# Patient Record
Sex: MALE | Race: WHITE | URBAN - NONMETROPOLITAN AREA
[De-identification: names, ages, dates, MRNs, and addresses within clinical notes are randomized per-mention and may not be internally consistent; named-entity substitution may affect disease eponyms.]

---

## 2017-09-25 ENCOUNTER — TELEPHONE (OUTPATIENT)
Dept: FAMILY MEDICINE CLINIC | Facility: CLINIC | Age: 62
End: 2017-09-25

## 2018-02-02 ENCOUNTER — PATIENT OUTREACH (OUTPATIENT)
Dept: FAMILY MEDICINE CLINIC | Facility: CLINIC | Age: 63
End: 2018-02-02

## 2018-08-24 ENCOUNTER — MED REC SCAN ONLY (OUTPATIENT)
Dept: FAMILY MEDICINE CLINIC | Facility: CLINIC | Age: 63
End: 2018-08-24

## 2018-09-10 ENCOUNTER — TELEPHONE (OUTPATIENT)
Dept: FAMILY MEDICINE CLINIC | Facility: CLINIC | Age: 63
End: 2018-09-10

## 2018-09-11 ENCOUNTER — OFFICE VISIT (OUTPATIENT)
Dept: FAMILY MEDICINE CLINIC | Facility: CLINIC | Age: 63
End: 2018-09-11
Payer: COMMERCIAL

## 2018-09-11 VITALS
WEIGHT: 238 LBS | OXYGEN SATURATION: 98 % | SYSTOLIC BLOOD PRESSURE: 160 MMHG | TEMPERATURE: 98 F | DIASTOLIC BLOOD PRESSURE: 100 MMHG | BODY MASS INDEX: 36 KG/M2 | HEART RATE: 74 BPM

## 2018-09-11 DIAGNOSIS — R03.0 ELEVATED BLOOD PRESSURE READING: ICD-10-CM

## 2018-09-11 DIAGNOSIS — F43.20 ADULT SITUATIONAL STRESS DISORDER: ICD-10-CM

## 2018-09-11 DIAGNOSIS — G50.0 TRIGEMINAL NEURALGIA: Primary | ICD-10-CM

## 2018-09-11 PROBLEM — N13.8 ENLARGED PROSTATE WITH URINARY OBSTRUCTION: Status: ACTIVE | Noted: 2017-06-29

## 2018-09-11 PROBLEM — N40.1 ENLARGED PROSTATE WITH URINARY OBSTRUCTION: Status: ACTIVE | Noted: 2017-06-29

## 2018-09-11 PROCEDURE — 99214 OFFICE O/P EST MOD 30 MIN: CPT | Performed by: FAMILY MEDICINE

## 2018-09-11 RX ORDER — CARBAMAZEPINE 200 MG/1
200 TABLET ORAL 3 TIMES DAILY
Qty: 30 TABLET | Refills: 1 | Status: SHIPPED | OUTPATIENT
Start: 2018-09-11 | End: 2018-09-26

## 2018-09-11 NOTE — PATIENT INSTRUCTIONS
I reviewed the signs and symptoms of trigeminal neuralgia  Would recommend carbamazepine 200 mg 3 times daily until pain completely resolved and gradually wean over the next 2-3 weeks  I reviewed goals for blood pressure as well as conservative management

## 2018-09-11 NOTE — PROGRESS NOTES
HPI:    Patient ID: Karla Munoz is a 63year old male. Patient presents with:  Pain    HPI patient has not been seen in over 2-1/2 years. He reports a prior history of trigeminal neuralgia diagnosed by Dr Darren Clay 2010.   C/o very sensitive right side of Neurological: He is alert and oriented to person, place, and time. Skin: Skin is warm and dry. No rash noted. Psychiatric: His speech is normal. His affect is blunt. He is slowed.     Blood pressure (!) 160/100, pulse 74, temperature 97.9 °F (36.6 °C),

## 2018-09-26 ENCOUNTER — TELEPHONE (OUTPATIENT)
Dept: FAMILY MEDICINE CLINIC | Facility: CLINIC | Age: 63
End: 2018-09-26

## 2018-09-26 RX ORDER — CARBAMAZEPINE 200 MG/1
400 TABLET ORAL 3 TIMES DAILY
Qty: 180 TABLET | Refills: 2 | Status: SHIPPED | OUTPATIENT
Start: 2018-09-26 | End: 2018-11-16

## 2018-09-26 NOTE — TELEPHONE ENCOUNTER
L/m to c/b to get more info-----BUT SEE BELOW.     He got carbamazepine 200mg 1 po TID #30 with 1 refill on 9/11

## 2018-09-26 NOTE — TELEPHONE ENCOUNTER
REFILL carBAMazepine TO DEJAH PATTEN, PT ONLY HAS 4 LEFT, HE WAS TAKING 2 AT A TIME & IT WAS BARELY HELPING, NOT SURE IF HE CAN TAKE MORE THAN THAT AT A TIME, CALL PT BETWEEN 12-12:30 OR AFTER 3:30 OR LEAVE DETAILED MESSAGE ON VOICEMAIL, PT SAID BEFORE H

## 2018-10-26 RX ORDER — CARBAMAZEPINE 200 MG/1
TABLET ORAL
Qty: 540 TABLET | Refills: 2 | OUTPATIENT
Start: 2018-10-26

## 2018-11-16 RX ORDER — CARBAMAZEPINE 200 MG/1
400 TABLET ORAL 3 TIMES DAILY
Qty: 540 TABLET | Refills: 0 | Status: SHIPPED | OUTPATIENT
Start: 2018-11-16 | End: 2019-01-09

## 2018-11-16 NOTE — TELEPHONE ENCOUNTER
Patient needs the CarBAMazepine 200 MG  Called to the mail order pharmacy-  [phone]   Fax [phone]    Use order # [id number]    Marcus Gomez stated that the mail order pharmacy contacted us about his refill and they haven't gotten a response.

## 2019-01-09 RX ORDER — CARBAMAZEPINE 200 MG/1
TABLET ORAL
Qty: 540 TABLET | Refills: 0 | Status: SHIPPED | OUTPATIENT
Start: 2019-01-09 | End: 2019-10-07

## 2019-03-15 ENCOUNTER — PATIENT OUTREACH (OUTPATIENT)
Dept: FAMILY MEDICINE CLINIC | Facility: CLINIC | Age: 64
End: 2019-03-15

## 2019-08-01 ENCOUNTER — PATIENT OUTREACH (OUTPATIENT)
Dept: FAMILY MEDICINE CLINIC | Facility: CLINIC | Age: 64
End: 2019-08-01

## 2019-08-01 NOTE — PROGRESS NOTES
Please call patient to schedule physical/Medicare AWV.  Overdue for colon cancer screening (and, likely, other services as well). Left message for patient to call back. Letter sent.

## 2019-10-07 ENCOUNTER — PATIENT OUTREACH (OUTPATIENT)
Dept: FAMILY MEDICINE CLINIC | Facility: CLINIC | Age: 64
End: 2019-10-07

## 2019-10-07 RX ORDER — CARBAMAZEPINE 200 MG/1
TABLET ORAL
Qty: 540 TABLET | Refills: 0 | Status: SHIPPED | OUTPATIENT
Start: 2019-10-07 | End: 2020-01-17

## 2019-12-30 ENCOUNTER — TELEPHONE (OUTPATIENT)
Dept: FAMILY MEDICINE CLINIC | Facility: CLINIC | Age: 64
End: 2019-12-30

## 2019-12-30 NOTE — TELEPHONE ENCOUNTER
Patient has pain in his thumb and fingers and thinks that it might be Carpal Tunnel. Who would Dr Khari Whitney recommend for this? Can leave a message if he does not answer.

## 2019-12-30 NOTE — TELEPHONE ENCOUNTER
I would recommend patient be seen first to determine what the problem actually is.   If he is convinced that this is carpal tunnel syndrome and simply wants to see a specialist, then I would recommend Dr. Kylah Graff at [facility]

## 2020-01-03 ENCOUNTER — OFFICE VISIT (OUTPATIENT)
Dept: FAMILY MEDICINE CLINIC | Facility: CLINIC | Age: 65
End: 2020-01-03
Payer: COMMERCIAL

## 2020-01-03 VITALS
SYSTOLIC BLOOD PRESSURE: 130 MMHG | HEIGHT: 67.75 IN | BODY MASS INDEX: 34.96 KG/M2 | DIASTOLIC BLOOD PRESSURE: 86 MMHG | WEIGHT: 228 LBS | TEMPERATURE: 98 F | HEART RATE: 72 BPM | OXYGEN SATURATION: 96 %

## 2020-01-03 DIAGNOSIS — K76.0 HEPATIC STEATOSIS: ICD-10-CM

## 2020-01-03 DIAGNOSIS — G50.0 TRIGEMINAL NEURALGIA: ICD-10-CM

## 2020-01-03 DIAGNOSIS — Z12.11 SCREEN FOR COLON CANCER: ICD-10-CM

## 2020-01-03 DIAGNOSIS — Z00.00 PREVENTATIVE HEALTH CARE: Primary | ICD-10-CM

## 2020-01-03 DIAGNOSIS — K59.01 SLOW TRANSIT CONSTIPATION: ICD-10-CM

## 2020-01-03 DIAGNOSIS — Z23 NEED FOR VACCINATION: ICD-10-CM

## 2020-01-03 DIAGNOSIS — M77.8 THUMB TENDONITIS: ICD-10-CM

## 2020-01-03 DIAGNOSIS — Z13.220 SCREENING, LIPID: ICD-10-CM

## 2020-01-03 DIAGNOSIS — E66.9 OBESITY (BMI 30.0-34.9): ICD-10-CM

## 2020-01-03 DIAGNOSIS — N13.8 ENLARGED PROSTATE WITH URINARY OBSTRUCTION: ICD-10-CM

## 2020-01-03 DIAGNOSIS — D17.0 LIPOMA OF NECK: ICD-10-CM

## 2020-01-03 DIAGNOSIS — Z00.00 LABORATORY EXAM ORDERED AS PART OF ROUTINE GENERAL MEDICAL EXAMINATION: ICD-10-CM

## 2020-01-03 DIAGNOSIS — Z12.5 SCREENING FOR PROSTATE CANCER: ICD-10-CM

## 2020-01-03 DIAGNOSIS — N40.1 ENLARGED PROSTATE WITH URINARY OBSTRUCTION: ICD-10-CM

## 2020-01-03 DIAGNOSIS — R14.0 ABDOMINAL BLOATING: ICD-10-CM

## 2020-01-03 DIAGNOSIS — C67.2 MALIGNANT NEOPLASM OF LATERAL WALL OF URINARY BLADDER (HCC): ICD-10-CM

## 2020-01-03 PROCEDURE — 99396 PREV VISIT EST AGE 40-64: CPT | Performed by: FAMILY MEDICINE

## 2020-01-03 PROCEDURE — 90471 IMMUNIZATION ADMIN: CPT | Performed by: FAMILY MEDICINE

## 2020-01-03 PROCEDURE — 90715 TDAP VACCINE 7 YRS/> IM: CPT | Performed by: FAMILY MEDICINE

## 2020-01-03 NOTE — H&P
Hayley Hartley is a 60year old male who presents for a complete physical exam.   HPI:   Pt voices concerns of bilateral hand pains- thru thumbs.  Hands occ fall asleep  Lump on left side back of neck- wants it removed    Wt Readings from Last 6 Encounters: +intermitent right sided facial pain, lips twinge, off meds now  LUNGS: denies shortness of breath with exertion, no coughing or wheezing  CARDIOVASCULAR: denies chest pain on exertion, palpations, anginal equivalent symptoms, no claudication , no peripher 3,cranial nerves are intact,motor and sensory are grossly intact, DTRs +2/4 UE/LE bilaterally, negative Tinel sign at wrist and elbows, negative Phalen sign bilaterally, negative Finkelstein test  PSYCH: affect: bright, speech: clear and coherent, Insight: of treatment. Will defer to urology    5. Screening for prostate cancer  Check PSA per urology    6. Screen for colon cancer  Discussed colon cancer screening options. Cologuard ordered    7.  Screening, lipid  I reviewed goals for lipids  - LIPID PANEL;

## 2020-01-03 NOTE — PATIENT INSTRUCTIONS
1. Preventative health care  I reviewed age-appropriate preventive health screening exams    2. Mal[facility]-  8/24/2018. Follow-up with Dr. Giancarlo Calixto as scheduled    3.  Trigeminal neuralgia-currently a forearm stretches. Changing his positions frequently while at work  Patient given prescription trial of diclofenac 50 mg twice daily, taken with food    King Nereyda.  [place] Westo, FAAFP

## 2020-01-06 ENCOUNTER — TELEPHONE (OUTPATIENT)
Dept: FAMILY MEDICINE CLINIC | Facility: CLINIC | Age: 65
End: 2020-01-06

## 2020-01-06 NOTE — TELEPHONE ENCOUNTER
Gigi Patton is calling because he saw Dr Luis Manrique on Friday an he said that Gigi Patton needed to have a Colagaurd done.   Gigi Patton was on the phone with his ins company and mentioned that he needed to have this test done and the ins company said that we needed to contact th

## 2020-01-07 ENCOUNTER — TELEPHONE (OUTPATIENT)
Dept: FAMILY MEDICINE CLINIC | Facility: CLINIC | Age: 65
End: 2020-01-07

## 2020-01-07 DIAGNOSIS — E78.00 ELEVATED CHOLESTEROL: Primary | ICD-10-CM

## 2020-01-07 LAB
CHOLESTEROL, TOTAL: 288
HDL CHOL: 44
LDL CHOLESTEROL: 208 MG/DL (ref ?–130)
TRIGLYCERIDES: 198

## 2020-01-07 NOTE — TELEPHONE ENCOUNTER
Please advise patient I reviewed his labs. His cholesterol is extremely high. His good cholesterol is relatively low and his bad cholesterol is shockingly high.   I would strongly recommend to start lipid-lowering therapy such as rosuvastatin 20 mg nightl

## 2020-01-09 RX ORDER — ROSUVASTATIN CALCIUM 20 MG/1
20 TABLET, COATED ORAL NIGHTLY
Qty: 90 TABLET | Refills: 0 | Status: SHIPPED | OUTPATIENT
Start: 2020-01-09 | End: 2020-04-22

## 2020-01-09 NOTE — TELEPHONE ENCOUNTER
Pt advised of below. States his diet is high is venison-----asks if that is the reason his chol is elevated? Advised that it may contribute to it, but his readings are very high, so even if he cut back, it would not likely bring it down to a normal level.

## 2020-01-17 RX ORDER — CARBAMAZEPINE 200 MG/1
TABLET ORAL
Qty: 540 TABLET | Refills: 0 | Status: SHIPPED | OUTPATIENT
Start: 2020-01-17 | End: 2020-06-29

## 2020-01-17 NOTE — TELEPHONE ENCOUNTER
Last refill #540 on 10/47/19  Last office visit on cpx on 1/32020  Future Appointments   Date Time Provider Roselyn Vasques   1/21/2020 11:15 AM DO SEB Corona [facility] Kady Solis

## 2020-01-21 ENCOUNTER — OFFICE VISIT (OUTPATIENT)
Dept: SURGERY | Facility: CLINIC | Age: 65
End: 2020-01-21
Payer: COMMERCIAL

## 2020-01-21 VITALS — WEIGHT: 228 LBS | HEIGHT: 68 IN | HEART RATE: 65 BPM | BODY MASS INDEX: 34.56 KG/M2

## 2020-01-21 DIAGNOSIS — R22.1 MASS OF NECK: Primary | ICD-10-CM

## 2020-01-21 PROCEDURE — 99243 OFF/OP CNSLTJ NEW/EST LOW 30: CPT | Performed by: SURGERY

## 2020-01-21 NOTE — H&P
Dennie Griffon is a 60year old male  Patient presents with:  Lump: New patient referred byDr. Marine Montana for fatty tumor on neck. c/o lump on neck for 20 years. Denies any pain.        REFERRED BY    Patient presents with  Lump at base of neck on Left has been diplopia or vision changes  RESPIRATORY: denies shortness of breath, wheezing or cough   CARDIOVASCULAR: denies chest pain or DELAROSA; no palpitations   GI: denies nausea, vomiting, constipation, diarrhea; no rectal bleeding; no heartburn  GENITAL/: no dysur

## 2020-01-26 ENCOUNTER — TELEPHONE (OUTPATIENT)
Dept: FAMILY MEDICINE CLINIC | Facility: CLINIC | Age: 65
End: 2020-01-26

## 2020-02-05 PROCEDURE — 88305 TISSUE EXAM BY PATHOLOGIST: CPT | Performed by: SURGERY

## 2020-02-05 PROCEDURE — 88304 TISSUE EXAM BY PATHOLOGIST: CPT | Performed by: SURGERY

## 2020-02-24 ENCOUNTER — MED REC SCAN ONLY (OUTPATIENT)
Dept: SURGERY | Facility: CLINIC | Age: 65
End: 2020-02-24

## 2020-04-22 ENCOUNTER — TELEPHONE (OUTPATIENT)
Dept: FAMILY MEDICINE CLINIC | Facility: CLINIC | Age: 65
End: 2020-04-22

## 2020-04-22 RX ORDER — ROSUVASTATIN CALCIUM 20 MG/1
20 TABLET, COATED ORAL NIGHTLY
Qty: 90 TABLET | Refills: 0 | Status: SHIPPED | OUTPATIENT
Start: 2020-04-22 | End: 2020-06-29

## 2020-06-29 RX ORDER — ROSUVASTATIN CALCIUM 20 MG/1
TABLET, COATED ORAL
Qty: 90 TABLET | Refills: 0 | Status: SHIPPED | OUTPATIENT
Start: 2020-06-29 | End: 2020-10-13

## 2020-06-29 RX ORDER — CARBAMAZEPINE 200 MG/1
TABLET ORAL
Qty: 540 TABLET | Refills: 0 | Status: SHIPPED | OUTPATIENT
Start: 2020-06-29 | End: 2021-08-31 | Stop reason: ALTCHOICE

## 2020-06-29 NOTE — TELEPHONE ENCOUNTER
Please determine if patient taking the medication. Is is a pharmacy triggered refill request is the patient requesting it.   Last contact, patient was no longer taking medication

## 2020-09-04 ENCOUNTER — TELEPHONE (OUTPATIENT)
Dept: FAMILY MEDICINE CLINIC | Facility: CLINIC | Age: 65
End: 2020-09-04

## 2020-09-04 ENCOUNTER — VIRTUAL PHONE E/M (OUTPATIENT)
Dept: FAMILY MEDICINE CLINIC | Facility: CLINIC | Age: 65
End: 2020-09-04
Payer: COMMERCIAL

## 2020-09-04 DIAGNOSIS — J01.40 ACUTE NON-RECURRENT PANSINUSITIS: Primary | ICD-10-CM

## 2020-09-04 PROCEDURE — 99213 OFFICE O/P EST LOW 20 MIN: CPT | Performed by: FAMILY MEDICINE

## 2020-09-04 RX ORDER — AMOXICILLIN AND CLAVULANATE POTASSIUM 875; 125 MG/1; MG/1
1 TABLET, FILM COATED ORAL 2 TIMES DAILY
Qty: 20 TABLET | Refills: 0 | Status: SHIPPED | OUTPATIENT
Start: 2020-09-04 | End: 2020-09-14

## 2020-09-04 NOTE — PROGRESS NOTES
Virtual Telephone Check-In    Andrea Prajapati verbally consents to a Virtual/Telephone Check-In visit on 09/04/20. Patient has been referred to the [facility] website at [email or web address] to review the yearly Consent to Treat document.     Patient understand but denies any shortness of breath, fever, myalgia, change in sense of taste or smell, diarrhea. He has been able exercise despite the symptoms.   He has been using Sudafed which provides some relief    Diagnoses and all orders for this visit:    Acute non

## 2020-09-04 NOTE — TELEPHONE ENCOUNTER
Virtual/Telephone Check-In    Kwaku Moy verbally consents to a Virtual/Telephone Check-In service on 09/04/2020. Patient has been referred to the [facility] website at [email or web address] to review the yearly Consent to Treat document.   Patient Mariella Mena

## 2020-10-12 NOTE — TELEPHONE ENCOUNTER
Last OV: 9/4/10 (telemed)  Last labs: 1/7/20    No future appointments.     Due for fasting labs----

## 2020-10-13 ENCOUNTER — TELEPHONE (OUTPATIENT)
Dept: FAMILY MEDICINE CLINIC | Facility: CLINIC | Age: 65
End: 2020-10-13

## 2020-10-13 DIAGNOSIS — R97.20 ABNORMAL PSA: ICD-10-CM

## 2020-10-13 DIAGNOSIS — Z79.899 ENCOUNTER FOR LONG-TERM (CURRENT) DRUG USE: ICD-10-CM

## 2020-10-13 DIAGNOSIS — E78.00 ELEVATED CHOLESTEROL: Primary | ICD-10-CM

## 2020-10-13 RX ORDER — ROSUVASTATIN CALCIUM 20 MG/1
TABLET, COATED ORAL
Qty: 90 TABLET | Refills: 0 | Status: SHIPPED | OUTPATIENT
Start: 2020-10-13 | End: 2021-02-16

## 2020-10-13 NOTE — TELEPHONE ENCOUNTER
Advised due for CMP and lipids.     Pt needs PSA for Dr. Philip Butcher also---will call his ofc for orders

## 2020-10-14 NOTE — TELEPHONE ENCOUNTER
Lab appt scheduled 10/21    Fax sent to Dr. Torrey Lyn of[facility] asking them to fax us pt's PSA order for his upcoming lab appt

## 2020-10-21 ENCOUNTER — LAB ENCOUNTER (OUTPATIENT)
Dept: LAB | Age: 65
End: 2020-10-21
Attending: FAMILY MEDICINE
Payer: COMMERCIAL

## 2020-10-21 ENCOUNTER — IMMUNIZATION (OUTPATIENT)
Dept: FAMILY MEDICINE CLINIC | Facility: CLINIC | Age: 65
End: 2020-10-21
Payer: COMMERCIAL

## 2020-10-21 DIAGNOSIS — Z23 NEED FOR VACCINATION: ICD-10-CM

## 2020-10-21 DIAGNOSIS — Z79.899 ENCOUNTER FOR LONG-TERM (CURRENT) DRUG USE: ICD-10-CM

## 2020-10-21 DIAGNOSIS — E78.00 ELEVATED CHOLESTEROL: ICD-10-CM

## 2020-10-21 DIAGNOSIS — R97.20 ABNORMAL PSA: ICD-10-CM

## 2020-10-21 PROCEDURE — 90686 IIV4 VACC NO PRSV 0.5 ML IM: CPT | Performed by: FAMILY MEDICINE

## 2020-10-21 PROCEDURE — 84153 ASSAY OF PSA TOTAL: CPT | Performed by: FAMILY MEDICINE

## 2020-10-21 PROCEDURE — 90471 IMMUNIZATION ADMIN: CPT | Performed by: FAMILY MEDICINE

## 2020-10-22 DIAGNOSIS — R97.20 ELEVATED PSA: ICD-10-CM

## 2020-10-22 DIAGNOSIS — E78.00 ELEVATED CHOLESTEROL: Primary | ICD-10-CM

## 2020-10-22 DIAGNOSIS — Z79.899 ENCOUNTER FOR LONG-TERM (CURRENT) DRUG USE: ICD-10-CM

## 2020-10-29 NOTE — TELEPHONE ENCOUNTER
LAST OV: 3/22/16   LAST REFILL: NO RECORD OF IT BEING PRESCRIBED. No future appointments.
Please advise patient that he needs an office visit. I have never seen him for trigeminal neuralgia nor prescribed the medication.
Pt advised of below. Offered different appt times tomorrow--pt doesn't think he can wait that long.    Recommended UC for eval
REFILL CARBAMAZEPINE TO DEJAH PATTEN, HE IS HAVING A FLARE UP OF Trigeminal neuralgia & IS IN ALOT OF PAIN
no

## 2021-02-16 RX ORDER — ROSUVASTATIN CALCIUM 20 MG/1
TABLET, COATED ORAL
Qty: 90 TABLET | Refills: 0 | Status: SHIPPED | OUTPATIENT
Start: 2021-02-16 | End: 2021-06-28

## 2021-06-28 RX ORDER — ROSUVASTATIN CALCIUM 20 MG/1
20 TABLET, COATED ORAL EVERY EVENING
Qty: 30 TABLET | Refills: 0 | Status: SHIPPED | OUTPATIENT
Start: 2021-06-28 | End: 2021-08-25

## 2021-06-28 NOTE — TELEPHONE ENCOUNTER
Last oV: 1/3/20  Last labs: 10/21/20    No future appointments. Due for OV and fasting labs--- Unable to leave message d/t continuous ring.

## 2021-08-25 RX ORDER — ROSUVASTATIN CALCIUM 20 MG/1
TABLET, COATED ORAL
Qty: 30 TABLET | Refills: 11 | Status: SHIPPED | OUTPATIENT
Start: 2021-08-25

## 2021-08-31 ENCOUNTER — LABORATORY ENCOUNTER (OUTPATIENT)
Dept: LAB | Age: 66
End: 2021-08-31
Attending: FAMILY MEDICINE
Payer: COMMERCIAL

## 2021-08-31 ENCOUNTER — OFFICE VISIT (OUTPATIENT)
Dept: FAMILY MEDICINE CLINIC | Facility: CLINIC | Age: 66
End: 2021-08-31
Payer: COMMERCIAL

## 2021-08-31 VITALS
DIASTOLIC BLOOD PRESSURE: 86 MMHG | HEIGHT: 68 IN | HEART RATE: 57 BPM | SYSTOLIC BLOOD PRESSURE: 130 MMHG | OXYGEN SATURATION: 96 % | BODY MASS INDEX: 34.56 KG/M2 | WEIGHT: 228 LBS | TEMPERATURE: 98 F | RESPIRATION RATE: 18 BRPM

## 2021-08-31 DIAGNOSIS — R31.29 OTHER MICROSCOPIC HEMATURIA: ICD-10-CM

## 2021-08-31 DIAGNOSIS — C67.2 MALIGNANT NEOPLASM OF LATERAL WALL OF URINARY BLADDER (HCC): ICD-10-CM

## 2021-08-31 DIAGNOSIS — R06.83 SNORING: ICD-10-CM

## 2021-08-31 DIAGNOSIS — Z79.899 ENCOUNTER FOR LONG-TERM (CURRENT) DRUG USE: ICD-10-CM

## 2021-08-31 DIAGNOSIS — Z23 NEED FOR VACCINATION: ICD-10-CM

## 2021-08-31 DIAGNOSIS — R97.20 ABNORMAL PSA: ICD-10-CM

## 2021-08-31 DIAGNOSIS — E78.00 ELEVATED CHOLESTEROL: ICD-10-CM

## 2021-08-31 DIAGNOSIS — E78.00 HYPERCHOLESTEREMIA: ICD-10-CM

## 2021-08-31 DIAGNOSIS — R53.83 FATIGUE, UNSPECIFIED TYPE: ICD-10-CM

## 2021-08-31 DIAGNOSIS — M18.0 OSTEOARTHRITIS OF CARPOMETACARPAL (CMC) JOINTS OF BOTH THUMBS, UNSPECIFIED OSTEOARTHRITIS TYPE: ICD-10-CM

## 2021-08-31 DIAGNOSIS — Z00.00 PREVENTATIVE HEALTH CARE: Primary | ICD-10-CM

## 2021-08-31 DIAGNOSIS — G50.0 TRIGEMINAL NEURALGIA: ICD-10-CM

## 2021-08-31 DIAGNOSIS — I70.0 ATHEROSCLEROSIS OF AORTA (HCC): ICD-10-CM

## 2021-08-31 DIAGNOSIS — N40.0 BENIGN PROSTATIC HYPERPLASIA WITHOUT LOWER URINARY TRACT SYMPTOMS: ICD-10-CM

## 2021-08-31 DIAGNOSIS — K76.0 HEPATIC STEATOSIS: ICD-10-CM

## 2021-08-31 DIAGNOSIS — R97.20 ELEVATED PSA: ICD-10-CM

## 2021-08-31 LAB
ALBUMIN SERPL-MCNC: 4.1 G/DL (ref 3.4–5)
ALBUMIN/GLOB SERPL: 1.1 {RATIO} (ref 1–2)
ALP LIVER SERPL-CCNC: 56 U/L
ALT SERPL-CCNC: 50 U/L
ANION GAP SERPL CALC-SCNC: 8 MMOL/L (ref 0–18)
APPEARANCE: CLEAR
AST SERPL-CCNC: 28 U/L (ref 15–37)
BILIRUB SERPL-MCNC: 0.6 MG/DL (ref 0.1–2)
BILIRUBIN: NEGATIVE
BUN BLD-MCNC: 12 MG/DL (ref 7–18)
CALCIUM BLD-MCNC: 9.1 MG/DL (ref 8.5–10.1)
CHLORIDE SERPL-SCNC: 108 MMOL/L (ref 98–112)
CHOLEST SMN-MCNC: 158 MG/DL (ref ?–200)
CO2 SERPL-SCNC: 24 MMOL/L (ref 21–32)
COMPLEXED PSA SERPL-MCNC: 3.75 NG/ML (ref ?–4)
CREAT BLD-MCNC: 0.7 MG/DL
GLOBULIN PLAS-MCNC: 3.6 G/DL (ref 2.8–4.4)
GLUCOSE (URINE DIPSTICK): NEGATIVE MG/DL
GLUCOSE BLD-MCNC: 95 MG/DL (ref 70–99)
HDLC SERPL-MCNC: 43 MG/DL (ref 40–59)
KETONES (URINE DIPSTICK): NEGATIVE MG/DL
LDLC SERPL CALC-MCNC: 89 MG/DL (ref ?–100)
M PROTEIN MFR SERPL ELPH: 7.7 G/DL (ref 6.4–8.2)
MULTISTIX LOT#: 5077 NUMERIC
NITRITE, URINE: POSITIVE
NONHDLC SERPL-MCNC: 115 MG/DL (ref ?–130)
OSMOLALITY SERPL CALC.SUM OF ELEC: 290 MOSM/KG (ref 275–295)
PH, URINE: 6 (ref 4.5–8)
POTASSIUM SERPL-SCNC: 4 MMOL/L (ref 3.5–5.1)
PROTEIN (URINE DIPSTICK): NEGATIVE MG/DL
SODIUM SERPL-SCNC: 140 MMOL/L (ref 136–145)
SPECIFIC GRAVITY: 1.02 (ref 1–1.03)
TRIGL SERPL-MCNC: 146 MG/DL (ref 30–149)
URINE-COLOR: YELLOW
UROBILINOGEN,SEMI-QN: 0.2 MG/DL (ref 0–1.9)
VLDLC SERPL CALC-MCNC: 24 MG/DL (ref 0–30)

## 2021-08-31 PROCEDURE — 87086 URINE CULTURE/COLONY COUNT: CPT | Performed by: FAMILY MEDICINE

## 2021-08-31 PROCEDURE — 87077 CULTURE AEROBIC IDENTIFY: CPT | Performed by: FAMILY MEDICINE

## 2021-08-31 PROCEDURE — 3075F SYST BP GE 130 - 139MM HG: CPT | Performed by: FAMILY MEDICINE

## 2021-08-31 PROCEDURE — 3079F DIAST BP 80-89 MM HG: CPT | Performed by: FAMILY MEDICINE

## 2021-08-31 PROCEDURE — 3008F BODY MASS INDEX DOCD: CPT | Performed by: FAMILY MEDICINE

## 2021-08-31 PROCEDURE — 90471 IMMUNIZATION ADMIN: CPT | Performed by: FAMILY MEDICINE

## 2021-08-31 PROCEDURE — 90750 HZV VACC RECOMBINANT IM: CPT | Performed by: FAMILY MEDICINE

## 2021-08-31 PROCEDURE — 99396 PREV VISIT EST AGE 40-64: CPT | Performed by: FAMILY MEDICINE

## 2021-08-31 PROCEDURE — 81003 URINALYSIS AUTO W/O SCOPE: CPT | Performed by: FAMILY MEDICINE

## 2021-08-31 RX ORDER — TADALAFIL 20 MG/1
TABLET ORAL
COMMUNITY
Start: 2021-07-07 | End: 2021-08-31 | Stop reason: ALTCHOICE

## 2021-08-31 NOTE — PATIENT INSTRUCTIONS
1. Preventative health care  I reviewed age-appropriate preventative health screening exams    2. [facility]  [facility]24[facility]2018.  cystoscopy 11/4/20, clear  Follow-up with Dr. Figueroa Dawson as scheduled for cystos

## 2021-08-31 NOTE — H&P
Andrea Prajapati is a 63year old male who presents for a complete physical exam.   HPI:   Pt voices concerns of bilateral thumb pain.     Wt Readings from Last 6 Encounters:  08/31/21 : 228 lb (103.4 kg)  01/28/20 : 228 lb (103.4 kg)  01/21/20 : 228 lb (103.4 urology   Occ: retired [employer] last yr. , Children: son.  Moved in w/ girlfriend  Exercise: three times per week.elliptical daily,  Diet: watches sugar closely          REVIEW OF SYSTEMS:   GENERAL: generally feels well, + fatigue, denies excessive da without murmur, no S3, S4, strong peripheral pulses, no peripheral edema  GI: +NBS's,no masses, HSM or tenderness  : deferred due to Urology eval  RECTAL: deferred due to Urology eval  BACK:  : FROM, No lateral curves   EXTREMITIES: no cyanosis, clubbing exams    2. Malignant neoplasm of lateral wall of [facility] bladder ([facility]-  8/24/2018. cystoscopy 11/4/20, clear  Follow-up with Dr. Sandi Ireland as scheduled for cystoscopy  - URINALYSIS, AUTO, W/O SCOPE    3.  Benign prostatic hyperplasia without lower u

## 2021-09-07 RX ORDER — CARBAMAZEPINE 200 MG/1
TABLET ORAL
Qty: 540 TABLET | Refills: 3 | OUTPATIENT
Start: 2021-09-07

## 2021-11-03 ENCOUNTER — TELEPHONE (OUTPATIENT)
Dept: FAMILY MEDICINE CLINIC | Facility: CLINIC | Age: 66
End: 2021-11-03

## 2021-11-03 DIAGNOSIS — Z23 NEED FOR SHINGLES VACCINE: Primary | ICD-10-CM

## 2021-11-03 NOTE — TELEPHONE ENCOUNTER
Pt. Is asking if he can come in tomorrow around 1130 for his second shingles vaccine/flu and possible pneumonia. No openings. I did put him in for Monday at 11 but he said he will be in town tomorrow for another appt.

## 2021-11-03 NOTE — TELEPHONE ENCOUNTER
Pt is due for 2nd Shingrix. Order placed--please sign. Due for pneumonia vaccine? ?    Will give flu vaccine also.

## 2021-11-04 ENCOUNTER — NURSE ONLY (OUTPATIENT)
Dept: FAMILY MEDICINE CLINIC | Facility: CLINIC | Age: 66
End: 2021-11-04
Payer: COMMERCIAL

## 2021-11-04 DIAGNOSIS — Z23 NEED FOR VACCINATION: Primary | ICD-10-CM

## 2021-11-04 PROCEDURE — 90471 IMMUNIZATION ADMIN: CPT | Performed by: FAMILY MEDICINE

## 2021-11-04 PROCEDURE — 90750 HZV VACC RECOMBINANT IM: CPT | Performed by: FAMILY MEDICINE

## 2021-11-04 PROCEDURE — 90472 IMMUNIZATION ADMIN EACH ADD: CPT | Performed by: FAMILY MEDICINE

## 2021-11-04 PROCEDURE — 90686 IIV4 VACC NO PRSV 0.5 ML IM: CPT | Performed by: FAMILY MEDICINE

## 2022-03-10 ENCOUNTER — OFFICE VISIT (OUTPATIENT)
Dept: FAMILY MEDICINE CLINIC | Facility: CLINIC | Age: 67
End: 2022-03-10
Payer: COMMERCIAL

## 2022-03-10 ENCOUNTER — LAB ENCOUNTER (OUTPATIENT)
Dept: LAB | Age: 67
End: 2022-03-10
Attending: FAMILY MEDICINE
Payer: COMMERCIAL

## 2022-03-10 VITALS
DIASTOLIC BLOOD PRESSURE: 80 MMHG | WEIGHT: 235 LBS | SYSTOLIC BLOOD PRESSURE: 120 MMHG | OXYGEN SATURATION: 98 % | HEART RATE: 64 BPM | RESPIRATION RATE: 18 BRPM | HEIGHT: 68 IN | TEMPERATURE: 98 F | BODY MASS INDEX: 35.61 KG/M2

## 2022-03-10 DIAGNOSIS — R39.12 BENIGN PROSTATIC HYPERPLASIA WITH WEAK URINARY STREAM: ICD-10-CM

## 2022-03-10 DIAGNOSIS — G50.0 TRIGEMINAL NEURALGIA: ICD-10-CM

## 2022-03-10 DIAGNOSIS — E66.01 SEVERE OBESITY (BMI 35.0-39.9) WITH COMORBIDITY (HCC): ICD-10-CM

## 2022-03-10 DIAGNOSIS — C67.2 MALIGNANT NEOPLASM OF LATERAL WALL OF URINARY BLADDER (HCC): ICD-10-CM

## 2022-03-10 DIAGNOSIS — I70.0 ATHEROSCLEROSIS OF AORTA (HCC): ICD-10-CM

## 2022-03-10 DIAGNOSIS — N40.1 BENIGN PROSTATIC HYPERPLASIA WITH WEAK URINARY STREAM: ICD-10-CM

## 2022-03-10 DIAGNOSIS — Z01.818 PRE-OP EVALUATION: Primary | ICD-10-CM

## 2022-03-10 DIAGNOSIS — E78.00 HYPERCHOLESTEREMIA: ICD-10-CM

## 2022-03-10 DIAGNOSIS — Z01.818 PRE-OP EVALUATION: ICD-10-CM

## 2022-03-10 DIAGNOSIS — M18.0 OSTEOARTHRITIS OF CARPOMETACARPAL (CMC) JOINTS OF BOTH THUMBS, UNSPECIFIED OSTEOARTHRITIS TYPE: ICD-10-CM

## 2022-03-10 DIAGNOSIS — N52.01 ERECTILE DYSFUNCTION DUE TO ARTERIAL INSUFFICIENCY: ICD-10-CM

## 2022-03-10 LAB
ANION GAP SERPL CALC-SCNC: 5 MMOL/L (ref 0–18)
BASOPHILS # BLD AUTO: 0.05 X10(3) UL (ref 0–0.2)
BASOPHILS NFR BLD AUTO: 0.9 %
BUN BLD-MCNC: 9 MG/DL (ref 7–18)
CALCIUM BLD-MCNC: 9.1 MG/DL (ref 8.5–10.1)
CHLORIDE SERPL-SCNC: 107 MMOL/L (ref 98–112)
CHOLEST SERPL-MCNC: 171 MG/DL (ref ?–200)
CO2 SERPL-SCNC: 26 MMOL/L (ref 21–32)
CREAT BLD-MCNC: 0.74 MG/DL
EOSINOPHIL # BLD AUTO: 0.13 X10(3) UL (ref 0–0.7)
EOSINOPHIL NFR BLD AUTO: 2.2 %
ERYTHROCYTE [DISTWIDTH] IN BLOOD BY AUTOMATED COUNT: 11.9 %
GLUCOSE BLD-MCNC: 86 MG/DL (ref 70–99)
HCT VFR BLD AUTO: 48.1 %
HDLC SERPL-MCNC: 45 MG/DL (ref 40–59)
HGB BLD-MCNC: 16 G/DL
IMM GRANULOCYTES # BLD AUTO: 0.01 X10(3) UL (ref 0–1)
IMM GRANULOCYTES NFR BLD: 0.2 %
LDLC SERPL CALC-MCNC: 85 MG/DL (ref ?–100)
LYMPHOCYTES # BLD AUTO: 2.51 X10(3) UL (ref 1–4)
LYMPHOCYTES NFR BLD AUTO: 43.1 %
MCH RBC QN AUTO: 32 PG (ref 26–34)
MCHC RBC AUTO-ENTMCNC: 33.3 G/DL (ref 31–37)
MCV RBC AUTO: 96.2 FL
MONOCYTES # BLD AUTO: 0.41 X10(3) UL (ref 0.1–1)
MONOCYTES NFR BLD AUTO: 7 %
NEUTROPHILS # BLD AUTO: 2.71 X10 (3) UL (ref 1.5–7.7)
NEUTROPHILS # BLD AUTO: 2.71 X10(3) UL (ref 1.5–7.7)
NEUTROPHILS NFR BLD AUTO: 46.6 %
NONHDLC SERPL-MCNC: 126 MG/DL (ref ?–130)
OSMOLALITY SERPL CALC.SUM OF ELEC: 284 MOSM/KG (ref 275–295)
PLATELET # BLD AUTO: 205 10(3)UL (ref 150–450)
RBC # BLD AUTO: 5 X10(6)UL
SODIUM SERPL-SCNC: 138 MMOL/L (ref 136–145)
TRIGL SERPL-MCNC: 248 MG/DL (ref 30–149)
VLDLC SERPL CALC-MCNC: 40 MG/DL (ref 0–30)
WBC # BLD AUTO: 5.8 X10(3) UL (ref 4–11)

## 2022-03-10 PROCEDURE — 3079F DIAST BP 80-89 MM HG: CPT | Performed by: FAMILY MEDICINE

## 2022-03-10 PROCEDURE — 3008F BODY MASS INDEX DOCD: CPT | Performed by: FAMILY MEDICINE

## 2022-03-10 PROCEDURE — 93000 ELECTROCARDIOGRAM COMPLETE: CPT | Performed by: FAMILY MEDICINE

## 2022-03-10 PROCEDURE — 99243 OFF/OP CNSLTJ NEW/EST LOW 30: CPT | Performed by: FAMILY MEDICINE

## 2022-03-10 PROCEDURE — 80048 BASIC METABOLIC PNL TOTAL CA: CPT | Performed by: FAMILY MEDICINE

## 2022-03-10 PROCEDURE — 3074F SYST BP LT 130 MM HG: CPT | Performed by: FAMILY MEDICINE

## 2022-03-10 PROCEDURE — 85025 COMPLETE CBC W/AUTO DIFF WBC: CPT | Performed by: FAMILY MEDICINE

## 2022-09-14 RX ORDER — ROSUVASTATIN CALCIUM 20 MG/1
TABLET, COATED ORAL
Qty: 90 TABLET | Refills: 0 | Status: SHIPPED | OUTPATIENT
Start: 2022-09-14

## 2022-12-19 RX ORDER — ROSUVASTATIN CALCIUM 20 MG/1
TABLET, COATED ORAL
Qty: 30 TABLET | Refills: 0 | Status: SHIPPED | OUTPATIENT
Start: 2022-12-19

## 2023-01-12 DIAGNOSIS — Z12.5 PROSTATE CANCER SCREENING: ICD-10-CM

## 2023-01-12 DIAGNOSIS — E78.00 HYPERCHOLESTEREMIA: Primary | ICD-10-CM

## 2023-01-12 NOTE — TELEPHONE ENCOUNTER
REFILL MEDS TO [facility]IL ORDER [facility] # [phone] FAX # [phone], HE NO LONGER USES [facility]IL [facility], WILL USE [employer] FOR LOCAL

## 2023-01-12 NOTE — TELEPHONE ENCOUNTER
Last office visit: 3/10/22  Last refill: 12/19/22  Labs Due: 9/11/22  No future appointments. Patient was due for lipid recheck in September. Are there any other labs you would like to have checked?

## 2023-01-13 RX ORDER — ROSUVASTATIN CALCIUM 20 MG/1
20 TABLET, COATED ORAL EVERY EVENING
Qty: 30 TABLET | Refills: 0 | Status: SHIPPED | OUTPATIENT
Start: 2023-01-13

## 2023-01-13 NOTE — TELEPHONE ENCOUNTER
Lab orders placed. Spoke with patient and he states he has an appointment with Dr. Springer  next week. He would like to go to that first and then schedule the lab work.

## 2023-02-14 RX ORDER — ROSUVASTATIN CALCIUM 20 MG/1
TABLET, COATED ORAL
Qty: 30 TABLET | Refills: 0 | Status: SHIPPED | OUTPATIENT
Start: 2023-02-14

## 2023-02-14 NOTE — TELEPHONE ENCOUNTER
Cholesterol Medication Protocol Failed 02/14/2023 12:46 PM   Protocol Details  ALT < 80    ALT resulted within past year    Lipid panel within past 12 months    Appointment within past 12 or next 3 months     Last visit 3/10/22  Lipids 3/10/22  ALT ---BMP 3/10/22  No future appointments.

## 2023-03-08 ENCOUNTER — TELEPHONE (OUTPATIENT)
Dept: FAMILY MEDICINE CLINIC | Facility: CLINIC | Age: 68
End: 2023-03-08

## 2023-03-08 NOTE — TELEPHONE ENCOUNTER
Dr Paty Simpson said not to have his labs drawn, because he is taking BCG treatments and that will interfere with the results.  When pt is finished with the treatments he will get the labs done

## 2023-03-08 NOTE — TELEPHONE ENCOUNTER
Pt advised of below.  He states he would like to wait on the lipids and ALT until he has PSA done---only wants to have labwork done once

## 2023-04-25 ENCOUNTER — TELEPHONE (OUTPATIENT)
Dept: FAMILY MEDICINE CLINIC | Facility: CLINIC | Age: 68
End: 2023-04-25

## 2023-04-25 NOTE — TELEPHONE ENCOUNTER
PT HAS PX & LAB APPT TOMORROW, HIS LAST BCG TREATMENT WAS ABOUT A MONTH AGO, WILL THIS AFFECT PSA RESULTS?  HE ALSO WANTS TO GET PNEUMONIA SHOT TOMORROW, CALL PT

## 2023-04-26 ENCOUNTER — OFFICE VISIT (OUTPATIENT)
Dept: FAMILY MEDICINE CLINIC | Facility: CLINIC | Age: 68
End: 2023-04-26
Payer: MEDICARE

## 2023-04-26 ENCOUNTER — LABORATORY ENCOUNTER (OUTPATIENT)
Dept: LAB | Age: 68
End: 2023-04-26
Attending: FAMILY MEDICINE
Payer: MEDICARE

## 2023-04-26 VITALS
WEIGHT: 226 LBS | TEMPERATURE: 98 F | DIASTOLIC BLOOD PRESSURE: 80 MMHG | RESPIRATION RATE: 18 BRPM | OXYGEN SATURATION: 97 % | HEIGHT: 68 IN | BODY MASS INDEX: 34.25 KG/M2 | HEART RATE: 69 BPM | SYSTOLIC BLOOD PRESSURE: 124 MMHG

## 2023-04-26 DIAGNOSIS — K76.0 HEPATIC STEATOSIS: ICD-10-CM

## 2023-04-26 DIAGNOSIS — I70.0 ATHEROSCLEROSIS OF AORTA (HCC): ICD-10-CM

## 2023-04-26 DIAGNOSIS — Z11.59 NEED FOR HEPATITIS C SCREENING TEST: ICD-10-CM

## 2023-04-26 DIAGNOSIS — R39.12 BENIGN PROSTATIC HYPERPLASIA WITH WEAK URINARY STREAM: ICD-10-CM

## 2023-04-26 DIAGNOSIS — N40.1 BENIGN PROSTATIC HYPERPLASIA WITH WEAK URINARY STREAM: ICD-10-CM

## 2023-04-26 DIAGNOSIS — Z23 NEED FOR VACCINATION: ICD-10-CM

## 2023-04-26 DIAGNOSIS — N52.01 ERECTILE DYSFUNCTION DUE TO ARTERIAL INSUFFICIENCY: ICD-10-CM

## 2023-04-26 DIAGNOSIS — Z00.00 ENCOUNTER FOR ANNUAL HEALTH EXAMINATION: Primary | ICD-10-CM

## 2023-04-26 DIAGNOSIS — Z12.5 PROSTATE CANCER SCREENING: ICD-10-CM

## 2023-04-26 DIAGNOSIS — G50.0 TRIGEMINAL NEURALGIA: ICD-10-CM

## 2023-04-26 DIAGNOSIS — E78.00 HYPERCHOLESTEREMIA: ICD-10-CM

## 2023-04-26 DIAGNOSIS — Z12.11 SCREEN FOR COLON CANCER: ICD-10-CM

## 2023-04-26 DIAGNOSIS — C67.2 MALIGNANT NEOPLASM OF LATERAL WALL OF URINARY BLADDER (HCC): ICD-10-CM

## 2023-04-26 DIAGNOSIS — M18.0 OSTEOARTHRITIS OF CARPOMETACARPAL (CMC) JOINTS OF BOTH THUMBS, UNSPECIFIED OSTEOARTHRITIS TYPE: ICD-10-CM

## 2023-04-26 DIAGNOSIS — Z00.00 ENCOUNTER FOR ANNUAL HEALTH EXAMINATION: ICD-10-CM

## 2023-04-26 DIAGNOSIS — E66.9 OBESITY (BMI 30.0-34.9): ICD-10-CM

## 2023-04-26 LAB
ALBUMIN SERPL-MCNC: 4.1 G/DL (ref 3.4–5)
ALBUMIN/GLOB SERPL: 1.2 {RATIO} (ref 1–2)
ALP LIVER SERPL-CCNC: 50 U/L
ALT SERPL-CCNC: 34 U/L
ANION GAP SERPL CALC-SCNC: 5 MMOL/L (ref 0–18)
AST SERPL-CCNC: 29 U/L (ref 15–37)
BILIRUB SERPL-MCNC: 0.5 MG/DL (ref 0.1–2)
BUN BLD-MCNC: 7 MG/DL (ref 7–18)
CALCIUM BLD-MCNC: 9.5 MG/DL (ref 8.5–10.1)
CHLORIDE SERPL-SCNC: 106 MMOL/L (ref 98–112)
CHOLEST SERPL-MCNC: 176 MG/DL (ref ?–200)
CO2 SERPL-SCNC: 26 MMOL/L (ref 21–32)
COMPLEXED PSA SERPL-MCNC: 6.15 NG/ML (ref ?–4)
CREAT BLD-MCNC: 0.76 MG/DL
FASTING PATIENT LIPID ANSWER: YES
FASTING STATUS PATIENT QL REPORTED: YES
GFR SERPLBLD BASED ON 1.73 SQ M-ARVRAT: 100 ML/MIN/1.73M2 (ref 60–?)
GLOBULIN PLAS-MCNC: 3.4 G/DL (ref 2.8–4.4)
GLUCOSE BLD-MCNC: 94 MG/DL (ref 70–99)
HCV AB SERPL QL IA: NONREACTIVE
HDLC SERPL-MCNC: 48 MG/DL (ref 40–59)
LDLC SERPL CALC-MCNC: 99 MG/DL (ref ?–100)
NONHDLC SERPL-MCNC: 128 MG/DL (ref ?–130)
OSMOLALITY SERPL CALC.SUM OF ELEC: 282 MOSM/KG (ref 275–295)
POTASSIUM SERPL-SCNC: 3.8 MMOL/L (ref 3.5–5.1)
PROT SERPL-MCNC: 7.5 G/DL (ref 6.4–8.2)
SODIUM SERPL-SCNC: 137 MMOL/L (ref 136–145)
TRIGL SERPL-MCNC: 170 MG/DL (ref 30–149)
VLDLC SERPL CALC-MCNC: 28 MG/DL (ref 0–30)

## 2023-04-26 PROCEDURE — 36415 COLL VENOUS BLD VENIPUNCTURE: CPT

## 2023-04-26 PROCEDURE — G0402 INITIAL PREVENTIVE EXAM: HCPCS | Performed by: FAMILY MEDICINE

## 2023-04-26 PROCEDURE — 80053 COMPREHEN METABOLIC PANEL: CPT

## 2023-04-26 PROCEDURE — 90677 PCV20 VACCINE IM: CPT | Performed by: FAMILY MEDICINE

## 2023-04-26 PROCEDURE — 86803 HEPATITIS C AB TEST: CPT

## 2023-04-26 PROCEDURE — G0009 ADMIN PNEUMOCOCCAL VACCINE: HCPCS | Performed by: FAMILY MEDICINE

## 2023-04-26 RX ORDER — ROSUVASTATIN CALCIUM 20 MG/1
20 TABLET, COATED ORAL EVERY EVENING
Qty: 90 TABLET | Refills: 3 | Status: SHIPPED | OUTPATIENT
Start: 2023-04-26

## 2023-04-26 NOTE — PATIENT INSTRUCTIONS
Joseph Turner's SCREENING SCHEDULE   Tests on this list are recommended by your physician but may not be covered, or covered at this frequency, by your insurer. Please check with your insurance carrier before scheduling to verify coverage.    PREVENTATIVE SERVICES FREQUENCY &  COVERAGE DETAILS LAST COMPLETION DATE   Diabetes Screening    Fasting Blood Sugar / Glucose    One screening every 12 months if never tested or if previously tested but not diagnosed with pre-diabetes   One screening every 6 months if diagnosed with pre-diabetes Lab Results   Component Value Date    GLU 86 03/10/2022        Cardiovascular Disease Screening    Lipid Panel  Cholesterol  Lipoprotein (HDL)  Triglycerides Covered every 5 years for all Medicare beneficiaries without apparent signs or symptoms of cardiovascular disease Lab Results   Component Value Date    CHOLEST 171 03/10/2022    HDL 45 03/10/2022    LDL 85 03/10/2022    TRIG 248 (H) 03/10/2022         Electrocardiogram (EKG)   Covered if needed at Welcome to Medicare, and non-screening if indicated for medical reasons 03/14/2022      Ultrasound Screening for Abdominal Aortic Aneurysm (AAA) Covered once in a lifetime for one of the following risk factors    Men who are 73-68 years old and have ever smoked    Anyone with a family history -     Colorectal Cancer Screening  Covered for ages 52-80; only need ONE of the following:    Colonoscopy   Covered every 10 years    Covered every 2 years if patient is at high risk or previous colonoscopy was abnormal 01/14/2020    Colorectal Cancer Screening due on 01/14/2023    Flexible Sigmoidoscopy   Covered every 4 years -    Fecal Occult Blood Test Covered annually -   Prostate Cancer Screening    Prostate-Specific Antigen (PSA) Annually Lab Results   Component Value Date    PSA 3.76 10/21/2020     PSA due on 08/31/2023   Immunizations    Influenza Covered once per flu season  Please get every year -  No recommendations at this time Pneumococcal Each vaccine ([id number] & [id number]) covered once after 65 Prevnar 13: -    Ftjserksk90: -     Pneumococcal Vaccination(1 - PCV) Never done    Hepatitis B One screening covered for patients with certain risk factors   -  No recommendations at this time    Tetanus Toxoid Not covered by Medicare Part B unless medically necessary (cut with metal); may be covered with your pharmacy prescription benefits -    Tetanus, Diptheria and Pertusis TD and TDaP Not covered by Medicare Part B -  No recommendations at this time    Zoster Not covered by Medicare Part B; may be covered with your pharmacy  prescription benefits -  No recommendations at this time      1. Encounter for annual health examination  I reviewed age-appropriate preventative health screening exams as well as advanced directives and immunizations  - COMP METABOLIC PANEL (14); Future    2. Hypercholesteremia  I reviewed goals for lipids. Continue statin therapy, check labs  - LIPID PANEL  - COMP METABOLIC PANEL (14); Future    3. Prostate cancer screening  Continue annual surveillance  - PSA SCREEN    4. Need for hepatitis C screening test  Reviewed current recommendations for hepatitis C screening  - HCV ANTIBODY; Future    5. Malignant neoplasm of lateral wall of urinary bladder ([facility]- recurrence 3/22/22, noninvasive, completed BCG 3/28/23  Follow-up with urology for cystoscopy in July as scheduled    6. Trigeminal neuralgia-currently asymptomatic off meds  May continue carbamazepine on an as-needed basis, monitor clinically    7. Atherosclerosis of aorta (HCC)-on CT scan 2020  Monitor clinically,    8. Benign prostatic hyperplasia with weak urinary stream  Reviewed signs and symptoms of lower urinary tract outlet obstruction as well as treatment options, patient declines need for prescription meds, continue saw palmetto    9. Obesity (BMI 30.0-34. 9)  Discussed importance of lower carbohydrate food choices, eating behavior modification daily aerobic activity with a goal of weight reduction    10. Osteoarthritis of carpometacarpal (CMC) joints of both thumbs, unspecified osteoarthritis type  Patient may benefit from topical CBD cream    11. Hepatic steatosis-seen on CT scan 3/28/15  Discussed importance of weight reduction    12. Erectile dysfunction due to arterial insufficiency  May use tadalafil or sildenafil as needed per urology    13.  Screen for colon cancer  Repeat Cologuard

## 2023-05-23 ENCOUNTER — MED REC SCAN ONLY (OUTPATIENT)
Dept: FAMILY MEDICINE CLINIC | Facility: CLINIC | Age: 68
End: 2023-05-23

## 2023-05-23 ENCOUNTER — TELEPHONE (OUTPATIENT)
Dept: FAMILY MEDICINE CLINIC | Facility: CLINIC | Age: 68
End: 2023-05-23

## 2024-03-11 ENCOUNTER — TELEPHONE (OUTPATIENT)
Dept: FAMILY MEDICINE CLINIC | Facility: CLINIC | Age: 69
End: 2024-03-11

## 2024-03-11 RX ORDER — ROSUVASTATIN CALCIUM 20 MG/1
20 TABLET, COATED ORAL EVERY EVENING
Qty: 90 TABLET | Refills: 0 | Status: SHIPPED | OUTPATIENT
Start: 2024-03-11

## 2024-03-19 ENCOUNTER — TELEPHONE (OUTPATIENT)
Dept: FAMILY MEDICINE CLINIC | Facility: CLINIC | Age: 69
End: 2024-03-19

## 2024-05-13 ENCOUNTER — OFFICE VISIT (OUTPATIENT)
Dept: FAMILY MEDICINE CLINIC | Facility: CLINIC | Age: 69
End: 2024-05-13
Payer: MEDICARE

## 2024-05-13 ENCOUNTER — LABORATORY ENCOUNTER (OUTPATIENT)
Dept: LAB | Age: 69
End: 2024-05-13
Attending: FAMILY MEDICINE

## 2024-05-13 VITALS
RESPIRATION RATE: 18 BRPM | SYSTOLIC BLOOD PRESSURE: 126 MMHG | HEIGHT: 68.6 IN | WEIGHT: 231.38 LBS | TEMPERATURE: 99 F | OXYGEN SATURATION: 95 % | DIASTOLIC BLOOD PRESSURE: 82 MMHG | HEART RATE: 65 BPM | BODY MASS INDEX: 34.66 KG/M2

## 2024-05-13 DIAGNOSIS — K76.0 HEPATIC STEATOSIS: ICD-10-CM

## 2024-05-13 DIAGNOSIS — C67.2 MALIGNANT NEOPLASM OF LATERAL WALL OF URINARY BLADDER (HCC): ICD-10-CM

## 2024-05-13 DIAGNOSIS — G89.29 CHRONIC BILATERAL LOW BACK PAIN WITHOUT SCIATICA: ICD-10-CM

## 2024-05-13 DIAGNOSIS — G50.0 TRIGEMINAL NEURALGIA: ICD-10-CM

## 2024-05-13 DIAGNOSIS — E66.9 OBESITY (BMI 30.0-34.9): ICD-10-CM

## 2024-05-13 DIAGNOSIS — E78.00 HYPERCHOLESTEREMIA: ICD-10-CM

## 2024-05-13 DIAGNOSIS — M54.50 CHRONIC BILATERAL LOW BACK PAIN WITHOUT SCIATICA: ICD-10-CM

## 2024-05-13 DIAGNOSIS — N52.01 ERECTILE DYSFUNCTION DUE TO ARTERIAL INSUFFICIENCY: ICD-10-CM

## 2024-05-13 DIAGNOSIS — Z00.00 ENCOUNTER FOR ANNUAL HEALTH EXAMINATION: Primary | ICD-10-CM

## 2024-05-13 DIAGNOSIS — G47.33 OSA (OBSTRUCTIVE SLEEP APNEA): ICD-10-CM

## 2024-05-13 DIAGNOSIS — M18.0 OSTEOARTHRITIS OF CARPOMETACARPAL (CMC) JOINTS OF BOTH THUMBS, UNSPECIFIED OSTEOARTHRITIS TYPE: ICD-10-CM

## 2024-05-13 DIAGNOSIS — I70.0 ATHEROSCLEROSIS OF AORTA (HCC): ICD-10-CM

## 2024-05-13 DIAGNOSIS — R39.12 BENIGN PROSTATIC HYPERPLASIA WITH WEAK URINARY STREAM: ICD-10-CM

## 2024-05-13 DIAGNOSIS — Z00.00 ENCOUNTER FOR ANNUAL HEALTH EXAMINATION: ICD-10-CM

## 2024-05-13 DIAGNOSIS — N40.1 BENIGN PROSTATIC HYPERPLASIA WITH WEAK URINARY STREAM: ICD-10-CM

## 2024-05-13 LAB
ALBUMIN SERPL-MCNC: 4.3 G/DL (ref 3.4–5)
ALBUMIN/GLOB SERPL: 1.4 {RATIO} (ref 1–2)
ALP LIVER SERPL-CCNC: 42 U/L
ALT SERPL-CCNC: 43 U/L
AMB EXT COLOGUARD RESULT: NEGATIVE
ANION GAP SERPL CALC-SCNC: 5 MMOL/L (ref 0–18)
AST SERPL-CCNC: 30 U/L (ref 15–37)
BILIRUB SERPL-MCNC: 0.3 MG/DL (ref 0.1–2)
BUN BLD-MCNC: 12 MG/DL (ref 9–23)
CALCIUM BLD-MCNC: 8.9 MG/DL (ref 8.5–10.1)
CHLORIDE SERPL-SCNC: 107 MMOL/L (ref 98–112)
CHOLEST SERPL-MCNC: 164 MG/DL (ref ?–200)
CO2 SERPL-SCNC: 26 MMOL/L (ref 21–32)
CREAT BLD-MCNC: 0.86 MG/DL
EGFRCR SERPLBLD CKD-EPI 2021: 95 ML/MIN/1.73M2 (ref 60–?)
FASTING PATIENT LIPID ANSWER: YES
FASTING STATUS PATIENT QL REPORTED: YES
GLOBULIN PLAS-MCNC: 3.1 G/DL (ref 2.8–4.4)
GLUCOSE BLD-MCNC: 107 MG/DL (ref 70–99)
HDLC SERPL-MCNC: 50 MG/DL (ref 40–59)
LDLC SERPL CALC-MCNC: 93 MG/DL (ref ?–100)
NONHDLC SERPL-MCNC: 114 MG/DL (ref ?–130)
OSMOLALITY SERPL CALC.SUM OF ELEC: 286 MOSM/KG (ref 275–295)
POTASSIUM SERPL-SCNC: 4.8 MMOL/L (ref 3.5–5.1)
PROT SERPL-MCNC: 7.4 G/DL (ref 6.4–8.2)
PSA: 1.94
SODIUM SERPL-SCNC: 138 MMOL/L (ref 136–145)
TRIGL SERPL-MCNC: 117 MG/DL (ref 30–149)
VLDLC SERPL CALC-MCNC: 19 MG/DL (ref 0–30)

## 2024-05-13 PROCEDURE — 36415 COLL VENOUS BLD VENIPUNCTURE: CPT

## 2024-05-13 PROCEDURE — 80061 LIPID PANEL: CPT

## 2024-05-13 PROCEDURE — 80053 COMPREHEN METABOLIC PANEL: CPT

## 2024-05-13 RX ORDER — TADALAFIL 5 MG/1
TABLET ORAL
COMMUNITY
Start: 2024-02-23

## 2024-05-13 RX ORDER — LATANOPROST 50 UG/ML
1 SOLUTION/ DROPS OPHTHALMIC DAILY
COMMUNITY
Start: 2024-02-01

## 2024-05-13 RX ORDER — FINASTERIDE 5 MG/1
5 TABLET, FILM COATED ORAL DAILY
COMMUNITY
Start: 2024-02-29 | End: 2025-02-28

## 2024-05-13 NOTE — PATIENT INSTRUCTIONS
Joseph Turner's SCREENING SCHEDULE   Tests on this list are recommended by your physician but may not be covered, or covered at this frequency, by your insurer.   Please check with your insurance carrier before scheduling to verify coverage.   PREVENTATIVE SERVICES FREQUENCY &  COVERAGE DETAILS LAST COMPLETION DATE   Diabetes Screening    Fasting Blood Sugar / Glucose    One screening every 12 months if never tested or if previously tested but not diagnosed with pre-diabetes   One screening every 6 months if diagnosed with pre-diabetes Lab Results   Component Value Date    GLU 94 04/26/2023        Cardiovascular Disease Screening    Lipid Panel  Cholesterol  Lipoprotein (HDL)  Triglycerides Covered every 5 years for all Medicare beneficiaries without apparent signs or symptoms of cardiovascular disease Lab Results   Component Value Date    CHOLEST 176 04/26/2023    HDL 48 04/26/2023    LDL 99 04/26/2023    TRIG 170 (H) 04/26/2023         Electrocardiogram (EKG)   Covered if needed at Welcome to Medicare, and non-screening if indicated for medical reasons 03/14/2022      Ultrasound Screening for Abdominal Aortic Aneurysm (AAA) Covered once in a lifetime for one of the following risk factors    Men who are 65-75 years old and have ever smoked    Anyone with a family history -     Colorectal Cancer Screening  Covered for ages 50-85; only need ONE of the following:    Colonoscopy   Covered every 10 years    Covered every 2 years if patient is at high risk or previous colonoscopy was abnormal 01/14/2020    Health Maintenance   Topic Date Due    Colorectal Cancer Screening  05/13/2023       Flexible Sigmoidoscopy   Covered every 4 years -    Fecal Occult Blood Test Covered annually -   Prostate Cancer Screening    Prostate-Specific Antigen (PSA) Annually Lab Results   Component Value Date    PSA 3.76 10/21/2020     Health Maintenance   Topic Date Due    PSA  04/26/2025      Immunizations    Influenza Covered once per flu  season  Please get every year -  No recommendations at this time    Pneumococcal Each vaccine (Rpzccki98 & [id number]) covered once after 65 Prevnar 13: -    Ihlzyetdy18: -     No recommendations at this time    Hepatitis B One screening covered for patients with certain risk factors   -  No recommendations at this time    Tetanus Toxoid Not covered by Medicare Part B unless medically necessary (cut with metal); may be covered with your pharmacy prescription benefits -    Tetanus, Diptheria and Pertusis TD and TDaP Not covered by Medicare Part B -  No recommendations at this time    Zoster Not covered by Medicare Part B; may be covered with your pharmacy  prescription benefits -  No recommendations at this time      1. Encounter for annual health examination  I reviewed age-appropriate preventative health screen exams as well as advanced directives and immunizations  - Lipid Panel [E]; Future  - Comp Metabolic Panel (14) [E]; Future    2. Hypercholesteremia  Reviewed goals for lipids and indication for statin therapy.  I advised patient that I do not suspect his back pain is related to statin side effects but the addition of co-Q10 should minimize any drug related myalgias.  If patient would like to test for statin myalgias, he may discontinue rosuvastatin for 2 weeks to see if symptoms resolve and if not, resume statin therapy  - Lipid Panel [E]; Future  - Comp Metabolic Panel (14) [E]; Future    3. Malignant neoplasm of lateral wall of urinary bladder (HCC)- recurrence 3/22/22, noninvasive, completed BCG 3/28/23, negative cystoscopy 2/8/24  Follow-up with urology as scheduled    4. Atherosclerosis of aorta (HCC)-on CT scan 2020  Monitor clinically, continue statin therapy as tolerated    5. Trigeminal neuralgia-  May continue Tegretol as prescribed    6. Hepatic steatosis-seen on CT scan 3/28/15  Monitor liver enzymes annually, avoid excessive Tylenol or alcohol  - Comp Metabolic Panel (14) [E]; Future    7.  Erectile dysfunction due to arterial insufficiency  Continue tadalafil    8. Benign prostatic hyperplasia with weak urinary stream  Continue current meds and urology follow-up as scheduled    9. Obesity (BMI 30.0-34.9)  Discussed importance of lower carbohydrate food choices, eating behavior modification and aerobic activity with a goal of weight reduction    10. ROJELIO (obstructive sleep apnea)-clinical suspicion  Discussed clinical suspicion for obstructive sleep apnea as well as cardiovascular complication of untreated disease.  I asked patient to strongly consider a sleep study which she wishes to defer at this time    11. Osteoarthritis of carpometacarpal (CMC) joints of both thumbs, unspecified osteoarthritis type  May use Tylenol or ibuprofen as needed    12. Chronic bilateral low back pain without sciatica  Patient instructed on proper spinal, hip, hamstring, piriformis stretches, activity as tolerated

## 2024-05-13 NOTE — H&P
Joseph Turner is a 68 year old male   Chief Complaint   Patient presents with    Physical     MA super visit    Lipids    BPH     HPI:   Pt voices concerns: see ROS    Wt Readings from Last 6 Encounters:   05/13/24 231 lb 6.4 oz (105 kg)   04/26/23 226 lb (102.5 kg)   03/10/22 235 lb (106.6 kg)   08/31/21 228 lb (103.4 kg)   01/28/20 228 lb (103.4 kg)   01/21/20 228 lb (103.4 kg)     Body mass index is 34.57 kg/m².     Cholesterol, Total (mg/dL)   Date Value   04/26/2023 176   03/10/2022 171   08/31/2021 158     CHOLESTEROL, TOTAL (no units)   Date Value   01/07/2020 288     HDL Cholesterol (mg/dL)   Date Value   04/26/2023 48   03/10/2022 45   08/31/2021 43     HDL CHOL (no units)   Date Value   01/07/2020 44     LDL Cholesterol (mg/dL)   Date Value   04/26/2023 99   03/10/2022 85   08/31/2021 89   01/07/2020 208     AST (U/L)   Date Value   04/26/2023 29   08/31/2021 28   10/21/2020 33     ALT (U/L)   Date Value   04/26/2023 34   08/31/2021 50   10/21/2020 48      Current Outpatient Medications   Medication Sig Dispense Refill    finasteride 5 MG Oral Tab Take 1 tablet (5 mg total) by mouth daily.      tadalafil 5 MG Oral Tab       latanoprost 0.005 % Ophthalmic Solution Place 1 drop into both eyes daily.      rosuvastatin 20 MG Oral Tab Take 1 tablet (20 mg total) by mouth every evening. 90 tablet 0     No Known Allergies     Past Medical History:    Bladder cancer (HCC)    pt last saw Dr Bae 8/24/18, pt stopped BCG maint, did not get CT scan as rec, no f/u since    Hypercholesterolemia    Trigeminal neuralgia    resolved      Past Surgical History:   Procedure Laterality Date    Cystoscopy,manipulatn  02/08/2024    negative for malignancy    Eeh amb cologuard (results only)  01/14/2020    negative    Eeh amb cologuard (results only)  05/12/2023    Negative Cologuard    Other surgical history  04/2015    TURBT    Other surgical history  03/22/2022    Noninvasive papillary urothelial carcinoma low-grade,  muscularis not involved    Skin surgery Left 02/05/2020    excision of neck lipoma      Family History   Problem Relation Age of Onset    Cancer Father         lung    Heart Disorder Mother     Heart Disorder Sister         cva    Psychiatric Sister     Stroke Sister     No Known Problems Sister     Heart Disorder Brother     Diabetes Brother     Cancer Other         Social History     Socioeconomic History    Marital status:    Tobacco Use    Smoking status: Never     Passive exposure: Never    Smokeless tobacco: Never   Vaping Use    Vaping status: Never Used   Substance and Sexual Activity    Alcohol use: Yes     Alcohol/week: 8.0 - 10.0 standard drinks of alcohol     Types: 8 - 10 Cans of beer per week     Comment: beers on a weekend    Drug use: No   Other Topics Concern    Caffeine Concern No    Exercise No    Seat Belt Yes    Special Diet No    Stress Concern No    Weight Concern No      Specialists: Dr Bae- urology   Occ: retired [employer] . , Children: son. Moved in w/ girlfriend  Advanced Directives: none  Exercise: three times per week.elliptical daily,golfs, fishes,hunts, dances, very active  Diet: watches sugar closely, caffeine: 12-16 oz /day     REVIEW OF SYSTEMS:   GENERAL: generally feels well,+ fatigue, + excessive daytime drowsiness, good appetite, wt up 5# over the past yr  SKIN: denies any unusual skin lesions or rashes, bit by a tick recently, girlfriend removed  EYES:denies blurred vision or double vision  ENT: chronic nasal congestion, denies PND or ST, + loud snoring and reported periods of apnea, denies hearing deficits  LUNGS: denies shortness of breath with exertion, no coughing or wheezing  CARDIOVASCULAR: denies chest pain on exertion, palpations, anginal equivalent symptoms, no claudication , no peripheral edema  GI: denies abdominal pain,denies heartburn, diarrhea, or change in bowel habits, no constipation  : denies dysuria, hesitancy, denies nocturia,+ slight  decreased urine stream-still taking finasteride and daily cialis  , some urgency, no incontinence, saw Dr Bae 5/9/24, PVPR 17 ml, has a cystoscopy later this week  MUSCULOSKELETAL:  bilateral thumb pain-chronic, clicks, lower back stiffness, still very active, hands hurt all the time  NEURO: denies headaches, tremors, dizziness, numbness and weakness  PSYCHE: denies depression or anxiety,  rare right sided flare ups of trigeminal neuralgia, takes tegretol sporadically  HEMATOLOGIC: denies unexplained bruising or bleeding  ENDOCRINE: denies heat or cold intolerance , no unexplained wt loss or gain  FUNCTIONAL ABILITY: Do you need help with preparing meals? no, Dressing? no , Hygiene? no, Using the bathroom? no, Transportation? no, Shopping? no, Taking medications? no, Banking? no  SAFETY: Does your home have throw rugs? no,  Adequate lighting? yes, Grab bars in bathroom/shower/tub? yes, Handrails on stairs? yes, Some alarms? yes    Get Up and Go test > 12 seconds?  no  EXAM:   /82 (BP Location: Left arm, Patient Position: Sitting, Cuff Size: large)   Pulse 65   Temp 98.7 °F (37.1 °C) (Temporal)   Resp 18   Ht 5' 8.6\" (1.742 m)   Wt 231 lb 6.4 oz (105 kg)   SpO2 95%   BMI 34.57 kg/m²   Body mass index is 34.57 kg/m².   GENERAL: well developed, well nourished,in no apparent distress  SKIN: no rashes,no suspicious lesions  ENT: EAC:  Clear, TMs: intact, Nose: turbinates normal, septum: midline, discharge: none, Pharynx: class 3 air way, no oral lesions  EYES:PERRLA, EOMI, normal optic disk,conjunctiva are clear  NECK: supple,no adenopathy,no bruits, no thyromegaly  LUNGS: clear to auscultation, no w/r/r  CARDIO: RRR without murmur, no S3, S4, strong peripheral pulses, no peripheral edema  GI: +NBS's,no masses, HSM or tenderness, diastases recti defect  : deferred due to Urology eval  RECTAL: deferred due to Urology eval  BACK:  : No lateral curves, tight lower back, flexion: Fingers to ankles, patient  unable to extend past neutral spine, symmetrically limited lateral flexion  EXTREMITIES: no cyanosis, clubbing or edema, FROM of all joints tested, tight wrist extensor mechanisms in the forearms bilaterally  Both hands: Tenderness over the flexor tendon of both thumbs, discomfort over the thenar eminence with resisted thumb flexion and opposition  NEURO: A &O X 3,cranial nerves are intact,motor and sensory are grossly intact, DTRs +2/4 UE/LE bilaterally, negative Tinel sign at wrist and elbows, negative Phalen sign bilaterally, negative Finkelstein test  PSYCH: affect: bright, speech: clear and coherent, Insight: appropriate  ASSESSMENT AND PLAN:   Joseph Turner is a 68 year old male    Encounter Diagnoses   Name Primary?    Encounter for annual health examination Yes    Hypercholesteremia     Malignant neoplasm of lateral wall of urinary bladder (HCC)- recurrence 3/22/22, noninvasive, completed BCG 3/28/23, negative cystoscopy 2/8/24     Atherosclerosis of aorta (HCC)-on CT scan 2020     Trigeminal neuralgia-currently asymptomatic off meds     Hepatic steatosis-seen on CT scan 3/28/15     Erectile dysfunction due to arterial insufficiency     Benign prostatic hyperplasia with weak urinary stream     Obesity (BMI 30.0-34.9)     ROJELIO (obstructive sleep apnea)-clinical suspicion     Osteoarthritis of carpometacarpal (CMC) joints of both thumbs, unspecified osteoarthritis type     Chronic bilateral low back pain without sciatica      Orders Placed This Encounter   Procedures    EEH AMB COLOGUARD (RESULTS ONLY)    PSA Total, Diagnostic    Lipid Panel [E]    Comp Metabolic Panel (14) [E]     Meds & Refills for this Visit:  Requested Prescriptions      No prescriptions requested or ordered in this encounter     Imaging & Consults:  None  No follow-ups on file.  Patient Instructions     Joseph Turner's SCREENING SCHEDULE   Tests on this list are recommended by your physician but may not be covered, or covered at this  frequency, by your insurer.   Please check with your insurance carrier before scheduling to verify coverage.   PREVENTATIVE SERVICES FREQUENCY &  COVERAGE DETAILS LAST COMPLETION DATE   Diabetes Screening    Fasting Blood Sugar / Glucose    One screening every 12 months if never tested or if previously tested but not diagnosed with pre-diabetes   One screening every 6 months if diagnosed with pre-diabetes Lab Results   Component Value Date    GLU 94 04/26/2023        Cardiovascular Disease Screening    Lipid Panel  Cholesterol  Lipoprotein (HDL)  Triglycerides Covered every 5 years for all Medicare beneficiaries without apparent signs or symptoms of cardiovascular disease Lab Results   Component Value Date    CHOLEST 176 04/26/2023    HDL 48 04/26/2023    LDL 99 04/26/2023    TRIG 170 (H) 04/26/2023         Electrocardiogram (EKG)   Covered if needed at Welcome to Medicare, and non-screening if indicated for medical reasons 03/14/2022      Ultrasound Screening for Abdominal Aortic Aneurysm (AAA) Covered once in a lifetime for one of the following risk factors    Men who are 65-75 years old and have ever smoked    Anyone with a family history -     Colorectal Cancer Screening  Covered for ages 50-85; only need ONE of the following:    Colonoscopy   Covered every 10 years    Covered every 2 years if patient is at high risk or previous colonoscopy was abnormal 01/14/2020    Health Maintenance   Topic Date Due    Colorectal Cancer Screening  05/13/2023       Flexible Sigmoidoscopy   Covered every 4 years -    Fecal Occult Blood Test Covered annually -   Prostate Cancer Screening    Prostate-Specific Antigen (PSA) Annually Lab Results   Component Value Date    PSA 3.76 10/21/2020     Health Maintenance   Topic Date Due    PSA  04/26/2025      Immunizations    Influenza Covered once per flu season  Please get every year -  No recommendations at this time    Pneumococcal Each vaccine ([id number] & [id number]) covered once  after 65 Prevnar 13: -    [id number]: -     No recommendations at this time    Hepatitis B One screening covered for patients with certain risk factors   -  No recommendations at this time    Tetanus Toxoid Not covered by Medicare Part B unless medically necessary (cut with metal); may be covered with your pharmacy prescription benefits -    Tetanus, Diptheria and Pertusis TD and TDaP Not covered by Medicare Part B -  No recommendations at this time    Zoster Not covered by Medicare Part B; may be covered with your pharmacy  prescription benefits -  No recommendations at this time      1. Encounter for annual health examination  I reviewed age-appropriate preventative health screen exams as well as advanced directives and immunizations  - Lipid Panel [E]; Future  - Comp Metabolic Panel (14) [E]; Future    2. Hypercholesteremia  Reviewed goals for lipids and indication for statin therapy.  I advised patient that I do not suspect his back pain is related to statin side effects but the addition of co-Q10 should minimize any drug related myalgias.  If patient would like to test for statin myalgias, he may discontinue rosuvastatin for 2 weeks to see if symptoms resolve and if not, resume statin therapy  - Lipid Panel [E]; Future  - Comp Metabolic Panel (14) [E]; Future    3. Malignant neoplasm of lateral wall of urinary bladder (HCC)- recurrence 3/22/22, noninvasive, completed BCG 3/28/23, negative cystoscopy 2/8/24  Follow-up with urology as scheduled    4. Atherosclerosis of aorta (HCC)-on CT scan 2020  Monitor clinically, continue statin therapy as tolerated    5. Trigeminal neuralgia-  May continue Tegretol as prescribed    6. Hepatic steatosis-seen on CT scan 3/28/15  Monitor liver enzymes annually, avoid excessive Tylenol or alcohol  - Comp Metabolic Panel (14) [E]; Future    7. Erectile dysfunction due to arterial insufficiency  Continue tadalafil    8. Benign prostatic hyperplasia with weak urinary  stream  Continue current meds and urology follow-up as scheduled    9. Obesity (BMI 30.0-34.9)  Discussed importance of lower carbohydrate food choices, eating behavior modification and aerobic activity with a goal of weight reduction    10. ROJELIO (obstructive sleep apnea)-clinical suspicion  Discussed clinical suspicion for obstructive sleep apnea as well as cardiovascular complication of untreated disease.  I asked patient to strongly consider a sleep study which she wishes to defer at this time    11. Osteoarthritis of carpometacarpal (CMC) joints of both thumbs, unspecified osteoarthritis type  May use Tylenol or ibuprofen as needed    12. Chronic bilateral low back pain without sciatica  Patient instructed on proper spinal, hip, hamstring, piriformis stretches, activity as tolerated    Abran Soni DO, FAAFP

## 2024-05-14 DIAGNOSIS — E78.00 HYPERCHOLESTEREMIA: Primary | ICD-10-CM

## 2024-05-14 DIAGNOSIS — Z00.00 ENCOUNTER FOR ANNUAL HEALTH EXAMINATION: ICD-10-CM

## 2024-05-14 DIAGNOSIS — K76.0 HEPATIC STEATOSIS: ICD-10-CM

## 2024-06-10 RX ORDER — ROSUVASTATIN CALCIUM 20 MG/1
20 TABLET, COATED ORAL EVERY EVENING
Qty: 90 TABLET | Refills: 3 | Status: SHIPPED | OUTPATIENT
Start: 2024-06-10

## 2024-06-10 NOTE — TELEPHONE ENCOUNTER
Last office visit:5/13/24   Last filled:3/11/24 #90 with 0 RF  Last labs:5/13/24     No future appointments.    Protocol:    Cholesterol Medication Protocol Xjqceo18/10/2024 02:06 PM   Protocol Details ALT < 80    ALT resulted within past year    Lipid panel within past 12 months    In person appointment or virtual visit in the past 12 mos or appointment in next 3 mos

## 2024-06-25 RX ORDER — CARBAMAZEPINE 200 MG/1
400 TABLET ORAL 3 TIMES DAILY
Qty: 540 TABLET | Refills: 0 | Status: SHIPPED | OUTPATIENT
Start: 2024-06-25

## 2025-02-20 RX ORDER — CARBAMAZEPINE 200 MG/1
400 TABLET ORAL 3 TIMES DAILY
Qty: 540 TABLET | Refills: 3 | Status: SHIPPED | OUTPATIENT
Start: 2025-02-20

## 2025-02-20 NOTE — TELEPHONE ENCOUNTER
Neurology Medications [id number]/19/2025 11:29 AM   Protocol Details In person appointment or virtual visit in the past 6 mos or appointment in next 3 mos    Medication is active on med list          Last office visit:  05/13/24  Last refill:  06/25/24  #540, no refills  Last lab:  05/13/24    No future appointments.

## 2025-06-11 ENCOUNTER — OFFICE VISIT (OUTPATIENT)
Dept: FAMILY MEDICINE CLINIC | Facility: CLINIC | Age: 70
End: 2025-06-11
Payer: MEDICARE

## 2025-06-11 ENCOUNTER — LABORATORY ENCOUNTER (OUTPATIENT)
Dept: LAB | Age: 70
End: 2025-06-11
Attending: FAMILY MEDICINE
Payer: MEDICARE

## 2025-06-11 ENCOUNTER — TELEPHONE (OUTPATIENT)
Dept: FAMILY MEDICINE CLINIC | Facility: CLINIC | Age: 70
End: 2025-06-11

## 2025-06-11 VITALS
WEIGHT: 230 LBS | OXYGEN SATURATION: 97 % | HEART RATE: 67 BPM | RESPIRATION RATE: 16 BRPM | TEMPERATURE: 98 F | DIASTOLIC BLOOD PRESSURE: 72 MMHG | HEIGHT: 68 IN | SYSTOLIC BLOOD PRESSURE: 132 MMHG | BODY MASS INDEX: 34.86 KG/M2

## 2025-06-11 DIAGNOSIS — F51.04 PSYCHOPHYSIOLOGICAL INSOMNIA: ICD-10-CM

## 2025-06-11 DIAGNOSIS — E66.811 OBESITY (BMI 30.0-34.9): ICD-10-CM

## 2025-06-11 DIAGNOSIS — K76.0 HEPATIC STEATOSIS: ICD-10-CM

## 2025-06-11 DIAGNOSIS — N52.01 ERECTILE DYSFUNCTION DUE TO ARTERIAL INSUFFICIENCY: ICD-10-CM

## 2025-06-11 DIAGNOSIS — Z12.5 SCREENING FOR PROSTATE CANCER: ICD-10-CM

## 2025-06-11 DIAGNOSIS — N40.1 BENIGN PROSTATIC HYPERPLASIA WITH WEAK URINARY STREAM: ICD-10-CM

## 2025-06-11 DIAGNOSIS — R39.12 BENIGN PROSTATIC HYPERPLASIA WITH WEAK URINARY STREAM: ICD-10-CM

## 2025-06-11 DIAGNOSIS — E78.00 HYPERCHOLESTEREMIA: ICD-10-CM

## 2025-06-11 DIAGNOSIS — G50.0 TRIGEMINAL NEURALGIA: ICD-10-CM

## 2025-06-11 DIAGNOSIS — G89.29 CHRONIC BILATERAL LOW BACK PAIN WITHOUT SCIATICA: ICD-10-CM

## 2025-06-11 DIAGNOSIS — I70.0 ATHEROSCLEROSIS OF AORTA: ICD-10-CM

## 2025-06-11 DIAGNOSIS — Z00.00 ENCOUNTER FOR ANNUAL HEALTH EXAMINATION: Primary | ICD-10-CM

## 2025-06-11 DIAGNOSIS — M18.0 OSTEOARTHRITIS OF CARPOMETACARPAL (CMC) JOINTS OF BOTH THUMBS, UNSPECIFIED OSTEOARTHRITIS TYPE: ICD-10-CM

## 2025-06-11 DIAGNOSIS — M54.50 CHRONIC BILATERAL LOW BACK PAIN WITHOUT SCIATICA: ICD-10-CM

## 2025-06-11 DIAGNOSIS — Z12.11 COLON CANCER SCREENING: ICD-10-CM

## 2025-06-11 DIAGNOSIS — Z85.51 HISTORY OF BLADDER CANCER: ICD-10-CM

## 2025-06-11 PROBLEM — C67.2 MALIGNANT NEOPLASM OF LATERAL WALL OF URINARY BLADDER (HCC): Status: RESOLVED | Noted: 2017-06-29 | Resolved: 2025-06-11

## 2025-06-11 LAB
ALBUMIN SERPL-MCNC: 4.7 G/DL (ref 3.2–4.8)
ALBUMIN/GLOB SERPL: 2 {RATIO} (ref 1–2)
ALP LIVER SERPL-CCNC: 47 U/L (ref 45–117)
ALT SERPL-CCNC: 43 U/L (ref 10–49)
ANION GAP SERPL CALC-SCNC: 10 MMOL/L (ref 0–18)
AST SERPL-CCNC: 38 U/L (ref ?–34)
BILIRUB SERPL-MCNC: 0.6 MG/DL (ref 0.2–1.1)
BUN BLD-MCNC: 9 MG/DL (ref 9–23)
CALCIUM BLD-MCNC: 9.7 MG/DL (ref 8.7–10.6)
CHLORIDE SERPL-SCNC: 106 MMOL/L (ref 98–112)
CHOLEST SERPL-MCNC: 164 MG/DL (ref ?–200)
CO2 SERPL-SCNC: 26 MMOL/L (ref 21–32)
COMPLEXED PSA SERPL-MCNC: 2.74 NG/ML (ref ?–4)
CREAT BLD-MCNC: 0.83 MG/DL (ref 0.7–1.3)
EGFRCR SERPLBLD CKD-EPI 2021: 96 ML/MIN/1.73M2 (ref 60–?)
FASTING PATIENT LIPID ANSWER: YES
FASTING STATUS PATIENT QL REPORTED: YES
GLOBULIN PLAS-MCNC: 2.4 G/DL (ref 2–3.5)
GLUCOSE BLD-MCNC: 96 MG/DL (ref 70–99)
HDLC SERPL-MCNC: 49 MG/DL (ref 40–59)
LDLC SERPL CALC-MCNC: 92 MG/DL (ref ?–100)
NONHDLC SERPL-MCNC: 115 MG/DL (ref ?–130)
OSMOLALITY SERPL CALC.SUM OF ELEC: 293 MOSM/KG (ref 275–295)
POTASSIUM SERPL-SCNC: 4.4 MMOL/L (ref 3.5–5.1)
PROT SERPL-MCNC: 7.1 G/DL (ref 5.7–8.2)
SODIUM SERPL-SCNC: 142 MMOL/L (ref 136–145)
TRIGL SERPL-MCNC: 130 MG/DL (ref 30–149)
VLDLC SERPL CALC-MCNC: 21 MG/DL (ref 0–30)

## 2025-06-11 PROCEDURE — 80053 COMPREHEN METABOLIC PANEL: CPT

## 2025-06-11 PROCEDURE — 36415 COLL VENOUS BLD VENIPUNCTURE: CPT

## 2025-06-11 PROCEDURE — 80061 LIPID PANEL: CPT

## 2025-06-11 RX ORDER — TAMSULOSIN HYDROCHLORIDE 0.4 MG/1
0.4 CAPSULE ORAL NIGHTLY
COMMUNITY
Start: 2025-05-20

## 2025-06-11 RX ORDER — FINASTERIDE 5 MG/1
5 TABLET, FILM COATED ORAL DAILY
COMMUNITY
Start: 2025-01-13 | End: 2026-01-13

## 2025-06-11 NOTE — TELEPHONE ENCOUNTER
Referral place by Dr Soni. Called pt and LVM advising referral placed and gave the contact number for the office

## 2025-06-11 NOTE — H&P
Joseph Turner is a 69 year old male   Chief Complaint   Patient presents with    Physical     MA super visit  Reviewed Preventative/Wellness form with patient.      Lipids    Bladder Cancer    BPH     HPI:   Pt voices concerns . Pt wants to get a colonoscopy, and have cataract surgery.  Patient is moving to [place] in August and wants to get a number of medical procedures done prior to leaving    Wt Readings from Last 6 Encounters:   06/11/25 230 lb (104.3 kg)   05/13/24 231 lb 6.4 oz (105 kg)   04/26/23 226 lb (102.5 kg)   03/10/22 235 lb (106.6 kg)   08/31/21 228 lb (103.4 kg)   01/28/20 228 lb (103.4 kg)     Body mass index is 34.97 kg/m².     Cholesterol, Total (mg/dL)   Date Value   05/13/2024 164   04/26/2023 176   03/10/2022 171     CHOLESTEROL, TOTAL (no units)   Date Value   01/07/2020 288     HDL Cholesterol (mg/dL)   Date Value   05/13/2024 50   04/26/2023 48   03/10/2022 45     HDL CHOL (no units)   Date Value   01/07/2020 44     LDL Cholesterol (mg/dL)   Date Value   05/13/2024 93   04/26/2023 99   03/10/2022 85   01/07/2020 208     AST (U/L)   Date Value   05/13/2024 30   04/26/2023 29   08/31/2021 28     ALT (U/L)   Date Value   05/13/2024 43   04/26/2023 34   08/31/2021 50      Current Medications[1]  Allergies[2]     Past Medical History[3]   Past Surgical History[4]   Family History[5]   Short Social Hx on File[6]     Specialists: Dr Bae- urology   Occ: retired [employer] . , Children: son. re-, will be moving to [place] in August  Advanced Directives: none  Exercise: three times per week.elliptical daily,golfs, fishes,hunts, dances, very active  Diet: watches sugar closely, caffeine: 12-16 oz /day     REVIEW OF SYSTEMS:   GENERAL: generally feels well,+ fatigue, + excessive daytime drowsiness, good appetite, wt stable over the past yr  SKIN: denies any unusual skin lesions or rashes  EYES:denies blurred vision or double vision  ENT: chronic nasal congestion, denies PND or ST, + loud  snoring and reported periods of apnea, denies hearing deficits  LUNGS: denies shortness of breath with exertion, no coughing or wheezing  CARDIOVASCULAR: denies chest pain on exertion, palpations, anginal equivalent symptoms, no claudication , no peripheral edema  GI: denies abdominal pain,denies heartburn, diarrhea, or change in bowel habits, no constipation  : denies dysuria, hesitancy, denies nocturia,+ slight decreased urine stream-still taking daily cialis  , some urgency, no incontinence, saw Dr Bae 1/13/25, PVPR 65 ml, flomax added  MUSCULOSKELETAL:  bilateral thumb pain-chronic, clicks, lower back stiffness, still very active, hands hurt all the time  NEURO: denies headaches, tremors, dizziness, numbness and weakness,  rare right sided flare ups of trigeminal neuralgia, takes tegretol sporadically  PSYCHE: denies depression or anxiety, patient complains of difficulty sleeping.  He has always had trouble falling asleep but now is taking longer and awaking frequently.  He reports he has \"a lot on his plate\" with upcoming sale of his house and moving out of state  HEMATOLOGIC: denies unexplained bruising or bleeding  ENDOCRINE: denies heat or cold intolerance , no unexplained wt loss or gain  FUNCTIONAL ABILITY: Do you need help with preparing meals? no, Dressing? no , Hygiene? no, Using the bathroom? no, Transportation? no, Shopping? no, Taking medications? no, Banking? no  SAFETY: Does your home have throw rugs? no,  Adequate lighting? yes, Grab bars in bathroom/shower/tub? yes, Handrails on stairs? yes, Some alarms? yes    Get Up and Go test > 12 seconds?  no  EXAM:   /72 (BP Location: Left arm, Patient Position: Sitting, Cuff Size: adult)   Pulse 67   Temp 98.2 °F (36.8 °C) (Temporal)   Resp 16   Ht 5' 8\" (1.727 m)   Wt 230 lb (104.3 kg)   SpO2 97%   BMI 34.97 kg/m²   Body mass index is 34.97 kg/m².   GENERAL: well developed, well nourished,in no apparent distress  SKIN: no rashes,no  suspicious lesions  ENT: EAC:  Clear, TMs: intact, Nose: turbinates normal, septum: midline, discharge: none, Pharynx: class 3 air way, no oral lesions  EYES:PERRLA, EOMI, normal optic disk,conjunctiva are clear, early cataracts  NECK: supple,no adenopathy,no bruits, no thyromegaly  LUNGS: clear to auscultation, no w/r/r  CARDIO: RRR without murmur, no S3, S4, strong peripheral pulses, no peripheral edema  GI: +NBS's,no masses, HSM or tenderness, diastases recti defect  : deferred due to Urology eval  RECTAL: deferred due to Urology eval  BACK:  : No lateral curves, tight lower back, flexion: Fingers to ankles, patient unable to extend past neutral spine, symmetrically limited lateral flexion  EXTREMITIES: no cyanosis, clubbing or edema, FROM of all joints tested  NEURO: A &O X 3,cranial nerves are intact,motor and sensory are grossly intact, DTRs +2/4 UE/LE bilaterally  PSYCH: affect: bright, speech: clear and coherent, Insight: appropriate  ASSESSMENT AND PLAN:   Joseph Turner is a 69 year old male  Encounter Diagnoses   Name Primary?    Encounter for annual health examination Yes    History of bladder cancer- recurrence 3/22/22, f/u cystoscopies normal after BCG infusions     Erectile dysfunction due to arterial insufficiency     Benign prostatic hyperplasia with weak urinary stream     Trigeminal neuralgia-currently asymptomatic off meds     Hypercholesteremia     Atherosclerosis of aorta (HCC)-on CT scan 2020     Hepatic steatosis-seen on CT scan 3/28/15     Chronic bilateral low back pain without sciatica     Osteoarthritis of carpometacarpal (CMC) joints of both thumbs, unspecified osteoarthritis type     Obesity (BMI 30.0-34.9)     Screening for prostate cancer     Colon cancer screening     Psychophysiological insomnia      Orders Placed This Encounter   Procedures    Lipid Panel [E]    Comp Metabolic Panel (14) [E]    PSA, Total (Screening) [E]     Meds & Refills for this Visit:  Requested Prescriptions       No prescriptions requested or ordered in this encounter     Imaging & Consults:  GASTRO - EXTERNAL  No follow-ups on file.  Patient Instructions   1. Encounter for annual health examination  I reviewed age-appropriate preventative health screening exams as well as advanced directives and immunizations  Patient advised to check with [facility], [facility] and [facility] for early availability of eval for cataracts    2. History of bladder cancer- recurrence 3/22/22, f/u cystoscopies normal after BCG infusions  Monitor clinically, follow-up with urology as scheduled    3. Erectile dysfunction due to arterial insufficiency  Continue daily tadalafil    4. Benign prostatic hyperplasia with weak urinary stream  Continue daily tadalafil and tamsulosin, monitor clinically discussed contributing factors    5. Trigeminal neuralgia-currently asymptomatic off meds  Discussed appropriate use of carbamazepine during flareups    6. Hypercholesteremia  Reviewed goals for lipids, continue statin therapy, check labs annually  - Lipid Panel [E]; Future  - Comp Metabolic Panel (14) [E]; Future    7. Atherosclerosis of aorta (HCC)-on CT scan 2020  Monitor clinically, continue statin therapy    8. Hepatic steatosis-seen on CT scan 3/28/15  Discussed importance of maintaining ideal body weight    9. Chronic bilateral low back pain without sciatica  Activity as tolerated, monitor clinically    10. Osteoarthritis of carpometacarpal (CMC) joints of both thumbs, unspecified osteoarthritis type  Activity as tolerated, monitor clinically    11. Obesity (BMI 30.0-34.9)  Reviewed weight loss management strategies.  Discussed importance of lower carbohydrate food choices, avoidance of starches and aerobic activity    12. Screening for prostate cancer  Continue annual surveillance  - PSA, Total (Screening) [E]; Future    13. Colon cancer screening  Patient referred to Dr. Clay at his request for local care.  Advised patient to  check with his insurance  - GASTRO - EXTERNAL    14.  Psychophysiological insomnia  Miya conservative management for sleep onset.  Recommend journaling, relaxation breathing, trial of melatonin 5 mg and magnesium glycinate 500 mg 1 hour before bedtime  Abran Soni DO, FAAFP         [1]   Current Outpatient Medications   Medication Sig Dispense Refill    tamsulosin 0.4 MG Oral Cap Take 1 capsule (0.4 mg total) by mouth at bedtime.      ROSUVASTATIN 20 MG Oral Tab TAKE 1 TABLET EVERY EVENING 90 tablet 3    tadalafil 5 MG Oral Tab       latanoprost 0.005 % Ophthalmic Solution Place 1 drop into both eyes daily.      finasteride 5 MG Oral Tab Take 1 tablet (5 mg total) by mouth daily. (Patient not taking: Reported on 6/11/2025)      CARBAMAZEPINE 200 MG Oral Tab TAKE 2 TABLETS THREE TIMES DAILY (Patient not taking: Reported on 6/11/2025) 540 tablet 3   [2] No Known Allergies  [3]   Past Medical History:   Bladder cancer (HCC)    pt last saw Dr Bae 1/13/25    Hypercholesterolemia    Trigeminal neuralgia    resolved   [4]   Past Surgical History:  Procedure Laterality Date    Cystoscopy,dil bladder,local anesth  01/13/2025    negative    Cystoscopy,manipulatn  02/08/2024    negative for malignancy    Eeh amb cologuard (results only)  01/14/2020    negative    Eeh amb cologuard (results only)  05/12/2023    Negative Cologuard    Other surgical history  04/2015    TURBT    Other surgical history  03/22/2022    Noninvasive papillary urothelial carcinoma low-grade, muscularis not involved    Skin surgery Left 02/05/2020    excision of neck lipoma   [5]   Family History  Problem Relation Age of Onset    Cancer Father         lung    Heart Disorder Mother     Depression Sister     Heart Disorder Sister         cva    Psychiatric Sister     Stroke Sister     Other (CKD) Sister     Heart Disorder Brother     Diabetes Brother     Cancer Other    [6]   Social History  Socioeconomic History    Marital status:     Tobacco Use    Smoking status: Never     Passive exposure: Never    Smokeless tobacco: Never   Vaping Use    Vaping status: Never Used   Substance and Sexual Activity    Alcohol use: Yes     Alcohol/week: 6.0 standard drinks of alcohol     Types: 6 Cans of beer per week     Comment: beers on a weekend    Drug use: No   Other Topics Concern    Caffeine Concern No    Exercise No    Seat Belt Yes    Special Diet No    Stress Concern No    Weight Concern No

## 2025-06-11 NOTE — PATIENT INSTRUCTIONS
1. Encounter for annual health examination  I reviewed age-appropriate preventative health screening exams as well as advanced directives and immunizations  Patient advised to check with [facility], [facility] and [facility] for early availability of eval for cataracts    2. History of bladder cancer- recurrence 3/22/22, f/u cystoscopies normal after BCG infusions  Monitor clinically, follow-up with urology as scheduled    3. Erectile dysfunction due to arterial insufficiency  Continue daily tadalafil    4. Benign prostatic hyperplasia with weak urinary stream  Continue daily tadalafil and tamsulosin, monitor clinically discussed contributing factors    5. Trigeminal neuralgia-currently asymptomatic off meds  Discussed appropriate use of carbamazepine during flareups    6. Hypercholesteremia  Reviewed goals for lipids, continue statin therapy, check labs annually  - Lipid Panel [E]; Future  - Comp Metabolic Panel (14) [E]; Future    7. Atherosclerosis of aorta (HCC)-on CT scan 2020  Monitor clinically, continue statin therapy    8. Hepatic steatosis-seen on CT scan 3/28/15  Discussed importance of maintaining ideal body weight    9. Chronic bilateral low back pain without sciatica  Activity as tolerated, monitor clinically    10. Osteoarthritis of carpometacarpal (CMC) joints of both thumbs, unspecified osteoarthritis type  Activity as tolerated, monitor clinically    11. Obesity (BMI 30.0-34.9)  Reviewed weight loss management strategies.  Discussed importance of lower carbohydrate food choices, avoidance of starches and aerobic activity    12. Screening for prostate cancer  Continue annual surveillance  - PSA, Total (Screening) [E]; Future    13. Colon cancer screening  Patient referred to Dr. Clay at his request for local care.  Advised patient to check with his insurance  - GASTRO - EXTERNAL    14.  Psychophysiological insomnia  Miya conservative management for sleep onset.  Recommend  journaling, relaxation breathing, trial of melatonin 5 mg and magnesium glycinate 500 mg 1 hour before bedtime

## 2025-06-12 DIAGNOSIS — E78.00 HYPERCHOLESTEREMIA: ICD-10-CM

## 2025-06-12 DIAGNOSIS — N52.01 ERECTILE DYSFUNCTION DUE TO ARTERIAL INSUFFICIENCY: ICD-10-CM

## 2025-06-12 DIAGNOSIS — R39.12 BENIGN PROSTATIC HYPERPLASIA WITH WEAK URINARY STREAM: Primary | ICD-10-CM

## 2025-06-12 DIAGNOSIS — Z00.00 ENCOUNTER FOR ANNUAL HEALTH EXAMINATION: ICD-10-CM

## 2025-06-12 DIAGNOSIS — Z12.5 SCREENING FOR PROSTATE CANCER: ICD-10-CM

## 2025-06-12 DIAGNOSIS — N40.1 BENIGN PROSTATIC HYPERPLASIA WITH WEAK URINARY STREAM: Primary | ICD-10-CM

## (undated) DIAGNOSIS — E78.00 HYPERCHOLESTEREMIA: Primary | ICD-10-CM

## (undated) NOTE — LETTER
02/03/20      [facility]      Dear Rao,    [facility] records indicate that you are due for fasting blood work (lipid panel, CMP)    Please call our office during normal business hours so that we may schedule this appointment

## (undated) NOTE — LETTER
[facility] [address] [id number]  [phone]            8/1/2019    [place]  [address] [address]    Dear Sveta Calixto,    [address] records indicate that you are due for a compl

## (undated) NOTE — Clinical Note
Chris Soto saw Savannah Dunlap in the office today. He presents with a soft tissue mass of his posterior neck. I am planning excision under general anesthesia.   Thank you Gordo Oquendo